# Patient Record
Sex: MALE | NOT HISPANIC OR LATINO | URBAN - METROPOLITAN AREA
[De-identification: names, ages, dates, MRNs, and addresses within clinical notes are randomized per-mention and may not be internally consistent; named-entity substitution may affect disease eponyms.]

---

## 2021-11-23 ENCOUNTER — INPATIENT (INPATIENT)
Facility: HOSPITAL | Age: 36
LOS: 0 days | Discharge: HOME | DRG: 177 | End: 2021-11-24
Attending: HOSPITALIST | Admitting: HOSPITALIST
Payer: OTHER GOVERNMENT

## 2021-11-23 VITALS
TEMPERATURE: 103 F | HEART RATE: 122 BPM | HEIGHT: 74 IN | SYSTOLIC BLOOD PRESSURE: 126 MMHG | DIASTOLIC BLOOD PRESSURE: 84 MMHG | RESPIRATION RATE: 18 BRPM | OXYGEN SATURATION: 97 % | WEIGHT: 266.76 LBS

## 2021-11-23 DIAGNOSIS — U07.1 COVID-19: ICD-10-CM

## 2021-11-23 LAB
ALBUMIN SERPL ELPH-MCNC: 2.8 G/DL — LOW (ref 3.5–5)
ALP SERPL-CCNC: 58 U/L — SIGNIFICANT CHANGE UP (ref 40–120)
ALT FLD-CCNC: 44 U/L DA — SIGNIFICANT CHANGE UP (ref 10–60)
ANION GAP SERPL CALC-SCNC: 9 MMOL/L — SIGNIFICANT CHANGE UP (ref 5–17)
APTT BLD: 34.8 SEC — SIGNIFICANT CHANGE UP (ref 27.5–35.5)
AST SERPL-CCNC: 21 U/L — SIGNIFICANT CHANGE UP (ref 10–40)
BASOPHILS # BLD AUTO: 0.02 K/UL — SIGNIFICANT CHANGE UP (ref 0–0.2)
BASOPHILS NFR BLD AUTO: 0.3 % — SIGNIFICANT CHANGE UP (ref 0–2)
BILIRUB SERPL-MCNC: 0.6 MG/DL — SIGNIFICANT CHANGE UP (ref 0.2–1.2)
BUN SERPL-MCNC: 15 MG/DL — SIGNIFICANT CHANGE UP (ref 7–18)
CALCIUM SERPL-MCNC: 9 MG/DL — SIGNIFICANT CHANGE UP (ref 8.4–10.5)
CHLORIDE SERPL-SCNC: 96 MMOL/L — SIGNIFICANT CHANGE UP (ref 96–108)
CO2 SERPL-SCNC: 27 MMOL/L — SIGNIFICANT CHANGE UP (ref 22–31)
CREAT SERPL-MCNC: 1.46 MG/DL — HIGH (ref 0.5–1.3)
EOSINOPHIL # BLD AUTO: 0 K/UL — SIGNIFICANT CHANGE UP (ref 0–0.5)
EOSINOPHIL NFR BLD AUTO: 0 % — SIGNIFICANT CHANGE UP (ref 0–6)
GLUCOSE SERPL-MCNC: 117 MG/DL — HIGH (ref 70–99)
HCT VFR BLD CALC: 49.3 % — SIGNIFICANT CHANGE UP (ref 39–50)
HGB BLD-MCNC: 16.8 G/DL — SIGNIFICANT CHANGE UP (ref 13–17)
IMM GRANULOCYTES NFR BLD AUTO: 0.1 % — SIGNIFICANT CHANGE UP (ref 0–1.5)
INR BLD: 1.21 RATIO — HIGH (ref 0.88–1.16)
LACTATE SERPL-SCNC: 1 MMOL/L — SIGNIFICANT CHANGE UP (ref 0.7–2)
LYMPHOCYTES # BLD AUTO: 1.18 K/UL — SIGNIFICANT CHANGE UP (ref 1–3.3)
LYMPHOCYTES # BLD AUTO: 17.5 % — SIGNIFICANT CHANGE UP (ref 13–44)
MCHC RBC-ENTMCNC: 28.5 PG — SIGNIFICANT CHANGE UP (ref 27–34)
MCHC RBC-ENTMCNC: 34.1 GM/DL — SIGNIFICANT CHANGE UP (ref 32–36)
MCV RBC AUTO: 83.6 FL — SIGNIFICANT CHANGE UP (ref 80–100)
MONOCYTES # BLD AUTO: 0.58 K/UL — SIGNIFICANT CHANGE UP (ref 0–0.9)
MONOCYTES NFR BLD AUTO: 8.6 % — SIGNIFICANT CHANGE UP (ref 2–14)
NEUTROPHILS # BLD AUTO: 4.97 K/UL — SIGNIFICANT CHANGE UP (ref 1.8–7.4)
NEUTROPHILS NFR BLD AUTO: 73.5 % — SIGNIFICANT CHANGE UP (ref 43–77)
NRBC # BLD: 0 /100 WBCS — SIGNIFICANT CHANGE UP (ref 0–0)
PLATELET # BLD AUTO: 173 K/UL — SIGNIFICANT CHANGE UP (ref 150–400)
POTASSIUM SERPL-MCNC: 4.6 MMOL/L — SIGNIFICANT CHANGE UP (ref 3.5–5.3)
POTASSIUM SERPL-SCNC: 4.6 MMOL/L — SIGNIFICANT CHANGE UP (ref 3.5–5.3)
PROT SERPL-MCNC: 8.2 G/DL — SIGNIFICANT CHANGE UP (ref 6–8.3)
PROTHROM AB SERPL-ACNC: 14.3 SEC — HIGH (ref 10.6–13.6)
RAPID RVP RESULT: DETECTED
RBC # BLD: 5.9 M/UL — HIGH (ref 4.2–5.8)
RBC # FLD: 12.5 % — SIGNIFICANT CHANGE UP (ref 10.3–14.5)
SARS-COV-2 RNA SPEC QL NAA+PROBE: DETECTED
SODIUM SERPL-SCNC: 132 MMOL/L — LOW (ref 135–145)
WBC # BLD: 6.76 K/UL — SIGNIFICANT CHANGE UP (ref 3.8–10.5)
WBC # FLD AUTO: 6.76 K/UL — SIGNIFICANT CHANGE UP (ref 3.8–10.5)

## 2021-11-23 PROCEDURE — 71045 X-RAY EXAM CHEST 1 VIEW: CPT | Mod: 26

## 2021-11-23 PROCEDURE — 99223 1ST HOSP IP/OBS HIGH 75: CPT

## 2021-11-23 PROCEDURE — 99285 EMERGENCY DEPT VISIT HI MDM: CPT

## 2021-11-23 RX ORDER — ACETAMINOPHEN 500 MG
650 TABLET ORAL ONCE
Refills: 0 | Status: COMPLETED | OUTPATIENT
Start: 2021-11-23 | End: 2021-11-23

## 2021-11-23 RX ORDER — SODIUM CHLORIDE 9 MG/ML
1000 INJECTION INTRAMUSCULAR; INTRAVENOUS; SUBCUTANEOUS ONCE
Refills: 0 | Status: COMPLETED | OUTPATIENT
Start: 2021-11-23 | End: 2021-11-23

## 2021-11-23 RX ORDER — SODIUM CHLORIDE 9 MG/ML
2500 INJECTION INTRAMUSCULAR; INTRAVENOUS; SUBCUTANEOUS ONCE
Refills: 0 | Status: COMPLETED | OUTPATIENT
Start: 2021-11-23 | End: 2021-11-23

## 2021-11-23 RX ADMIN — Medication 650 MILLIGRAM(S): at 19:44

## 2021-11-23 RX ADMIN — SODIUM CHLORIDE 2500 MILLILITER(S): 9 INJECTION INTRAMUSCULAR; INTRAVENOUS; SUBCUTANEOUS at 19:44

## 2021-11-23 RX ADMIN — SODIUM CHLORIDE 1000 MILLILITER(S): 9 INJECTION INTRAMUSCULAR; INTRAVENOUS; SUBCUTANEOUS at 22:12

## 2021-11-23 RX ADMIN — Medication 650 MILLIGRAM(S): at 22:12

## 2021-11-23 NOTE — ED PROVIDER NOTE - OBJECTIVE STATEMENT
Patient reports 3 days of fever, cough, vomiting, diarrhea, abd pain. Vomit is NBNB. Diarrhea is watery, non-bloody. Abdominal pain is diffuse, crampy, intermittent. No ha, cp, sob

## 2021-11-23 NOTE — H&P ADULT - ATTENDING COMMENTS
Pt seen and examined.  Case discussed with MAR.  35 year old man with 3 days of fever and cough. Denies SOB or dizziness. Associated GI symptoms - nausea, vomiting, abdominal pain and diarrhea.    Vital Signs Last 24 Hrs  T(C): 37.2 (23 Nov 2021 21:25), Max: 39.4 (23 Nov 2021 18:59)  T(F): 98.9 (23 Nov 2021 21:25), Max: 102.9 (23 Nov 2021 18:59)  HR: 92 (23 Nov 2021 21:25) (92 - 122)  BP: 89/57 (23 Nov 2021 21:25) (89/57 - 126/84)  RR: 18 (23 Nov 2021 21:25) (18 - 18)  SpO2: 96% (23 Nov 2021 21:25) (96% - 97%)      Labs                         16.8   6.76  )-----------( 173      ( 23 Nov 2021 19:35 )             49.3     11-23    132<L>  |  96  |  15  ----------------------------<  117<H>  4.6   |  27  |  1.46<H>    Ca    9.0      23 Nov 2021 19:35    TPro  8.2  /  Alb  2.8<L>  /  TBili  0.6  /  DBili  x   /  AST  21  /  ALT  44  /  AlkPhos  58  11-23    SARS-CoV-2: Detected (23 Nov 2021 19:35)    CXR   B/L widespread interstitial infiltrates - R>> L    Impression   - Acute COVID 19 infection   - CARLA -pre-renal from   - Hypotension     A/P   Admit to Medicine with airborne precaution   Supportive care  Check O2 saturation   Evaluate for monoclonial antibodies infusion   Gentle hydration   Monitor vital signs and chemistry Pt seen and examined.  Case discussed with MAR.  35 year old man with 3 days of fever and cough. Denies SOB or dizziness. Associated GI symptoms - nausea, vomiting, abdominal pain and diarrhea.    Vital Signs Last 24 Hrs  T(C): 37.2 (23 Nov 2021 21:25), Max: 39.4 (23 Nov 2021 18:59)  T(F): 98.9 (23 Nov 2021 21:25), Max: 102.9 (23 Nov 2021 18:59)  HR: 92 (23 Nov 2021 21:25) (92 - 122)  BP: 89/57 (23 Nov 2021 21:25) (89/57 - 126/84)  RR: 18 (23 Nov 2021 21:25) (18 - 18)  SpO2: 96% (23 Nov 2021 21:25) (96% - 97%)      Labs                         16.8   6.76  )-----------( 173      ( 23 Nov 2021 19:35 )             49.3     11-23    132<L>  |  96  |  15  ----------------------------<  117<H>  4.6   |  27  |  1.46<H>    Ca    9.0      23 Nov 2021 19:35    TPro  8.2  /  Alb  2.8<L>  /  TBili  0.6  /  DBili  x   /  AST  21  /  ALT  44  /  AlkPhos  58  11-23    SARS-CoV-2: Detected (23 Nov 2021 19:35)    CXR   B/L widespread interstitial infiltrates - R>> L    Impression   - Acute mild COVID 19 infection with mostly GI symptoms  - CARLA -pre-renal from GI losses  - Hypotension responsive to IVF    A/P   Admit to Medicine with airborne precaution   Supportive care  Check O2 saturation   Evaluate for monoclonial antibodies infusion   Gentle hydration   Monitor vital signs and chemistry  Discharge planning - estimated length of stay  < 24 hours

## 2021-11-23 NOTE — H&P ADULT - HISTORY OF PRESENT ILLNESS
35 year old man with no significant past medical hx came to ED c/o cough, fever, chills, headache, nausea, vomiting, diarrhea, poor oral intake for the last 3 days. Patient is unvaccinated for COVID19, tested positive for COVID19 on admission. Patient denies SOB, palpitations, chest pain, bleeding, hemoptysis, abdominal pain or dizziness.

## 2021-11-23 NOTE — ED PROVIDER NOTE - PROGRESS NOTE DETAILS
Patient is resting comfortably, NAD. Now hypotensive, though he feels much better. WIll admit for IV fluids. Endorsed to Dr. veras

## 2021-11-23 NOTE — H&P ADULT - PROBLEM SELECTOR PLAN 2
- On admission, patient with Cr 1.4  - CARLA most likely pre renal in the setting of acute infection, poor oral intake, GI losses  - F/u UA, urine lytes (urine sodium, urine creatinine, urine urea)  - Avoid nephrotoxins, NSAIDS, ACEI and ARBS  - Started on IVF   - Monitor BMP daily - On admission, patient with Cr 1.4  - CARLA most likely pre renal in the setting of acute infection, poor oral intake, GI losses  - F/u UA, urine lytes (urine sodium, urine creatinine, urine urea)  - Avoid nephrotoxins, NSAIDS, ACE I and ARBS  - Started on IVF   - Monitor BMP daily

## 2021-11-23 NOTE — H&P ADULT - PROBLEM SELECTOR PLAN 4
IMPROVE VTE Individual Risk Assessment  RISK                                                                Points  [  ] Previous VTE                                                  3  [  ] Thrombophilia                                               2  [  ] Lower limb paralysis                                      2        (unable to hold up >15 seconds)    [  ] Current Cancer                                              2         (within 6 months)  [  ] Immobilization > 24 hrs                                1  [  ] ICU/CCU stay > 24 hours                              1  [  ] Age > 60                                                      1  IMPROVE VTE Score _________    PPI for GI prophylaxis  Lovenox for DVT PPX

## 2021-11-23 NOTE — H&P ADULT - NSHPREVIEWOFSYSTEMS_GEN_ALL_CORE
Constitutional- fever+, chills+, no weight loss, weight gain or generalized weakness  Eyes – no vision loss or changes, no pain, no redness  ENT  – No hearing changes, no tinnitus, no discharge, no pain  - No runny nose, no congestion, no pain  - No sore throat, no hoarseness, no mouth sores, no voice change  CVS – No chest pain/no palpitations, or SOB  Respiratory - no hemoptysis, wheezing, or SOB  GI – diarrhea+, no dysphagia, heartburn, nausea, anorexia, emesis, abd pain, or change in bowel habits   – no frequency, urgency, hesitancy, nocturia, discharge, or weak stream  Skin – no rashes, lumps, or pruritus  AGUSTIN – no joint pain, stiffness, back pain, redness or swelling in joints  Psych – no confusion, depression , anxiety, or insomnia  Neuro – no headache dizziness, syncope, seizures, tremors, numbness, amnesia, or falls  Endocrine – no cold, heat intolerance, sweating, excessive hunger or thirst

## 2021-11-23 NOTE — ED ADULT NURSE NOTE - ISOLATION TYPE:
Pt c/o ongoing SOB from \"being in the streets and cold all the time.\" Pt was seen in ED 12/28 for same CC, pending covid results, using albuterol inhaler but states no relief.   
None

## 2021-11-23 NOTE — H&P ADULT - MUSCULOSKELETAL
normal mood with appropriate affect ROM intact/no joint swelling/no joint erythema/no joint warmth/normal strength detailed exam

## 2021-11-23 NOTE — H&P ADULT - PROBLEM SELECTOR PLAN 1
- On admission, patient was c/o cough, fever  - CXR showed bilateral patchy infiltrates  - COVID19 PCR +ve  - F/u COVID19 AB, LDH, ferritin, D-Dimer, CRP every 3 days   - Tylenol, Albuterol Inhaler, Robitussin PRN  - Monitor respiratory status   - Started on Decadron 6 mg IV daily for 10 days   - Start oxygen supplementation if O2 sat <92%  - Isolation precautions - On admission, patient was c/o cough, fever  - CXR showed bilateral patchy infiltrates  - COVID19 PCR +ve  - F/u COVID19 AB, LDH, ferritin, D-Dimer, CRP every 3 days   - Tylenol, Albuterol Inhaler, Robitussin PRN  - Monitor respiratory status   - Start oxygen supplementation if O2 sat <92%  - Isolation precautions Pt with mild infection with mostly GI symptom  Concerned more about his vomiting and diarrhea  Admitted for hypotension  - CXR showed bilateral patchy infiltrates  - MILD COVID 19 Infection   - COVID19 PCR +ve  - F/u COVID19 AB, LDH, ferritin, D-Dimer, CRP   - Tylenol, Albuterol Inhaler, Robitussin PRN  - Monitor respiratory status   - Start oxygen supplementation if O2 sat <92%  - Isolation precautions  - Estimate discharge today

## 2021-11-23 NOTE — H&P ADULT - PROBLEM SELECTOR PLAN 3
- Patient with hypotension most likely 2/2 viral infection   - Started on IVF   - Monitor BP - Patient with hypotension most likely 2/2 Gi symptoms of COVID 19 infection  - Started on IVF   - Monitor BP

## 2021-11-24 VITALS
DIASTOLIC BLOOD PRESSURE: 67 MMHG | SYSTOLIC BLOOD PRESSURE: 111 MMHG | OXYGEN SATURATION: 97 % | HEART RATE: 78 BPM | TEMPERATURE: 98 F | RESPIRATION RATE: 18 BRPM

## 2021-11-24 DIAGNOSIS — I95.9 HYPOTENSION, UNSPECIFIED: ICD-10-CM

## 2021-11-24 DIAGNOSIS — U07.1 COVID-19: ICD-10-CM

## 2021-11-24 DIAGNOSIS — Z29.9 ENCOUNTER FOR PROPHYLACTIC MEASURES, UNSPECIFIED: ICD-10-CM

## 2021-11-24 DIAGNOSIS — N17.9 ACUTE KIDNEY FAILURE, UNSPECIFIED: ICD-10-CM

## 2021-11-24 LAB
ANION GAP SERPL CALC-SCNC: 7 MMOL/L — SIGNIFICANT CHANGE UP (ref 5–17)
APPEARANCE UR: CLEAR — SIGNIFICANT CHANGE UP
BILIRUB UR-MCNC: NEGATIVE — SIGNIFICANT CHANGE UP
BUN SERPL-MCNC: 12 MG/DL — SIGNIFICANT CHANGE UP (ref 7–18)
CALCIUM SERPL-MCNC: 8.3 MG/DL — LOW (ref 8.4–10.5)
CHLORIDE SERPL-SCNC: 104 MMOL/L — SIGNIFICANT CHANGE UP (ref 96–108)
CO2 SERPL-SCNC: 24 MMOL/L — SIGNIFICANT CHANGE UP (ref 22–31)
COLOR SPEC: YELLOW — SIGNIFICANT CHANGE UP
CREAT ?TM UR-MCNC: 66 MG/DL — SIGNIFICANT CHANGE UP
CREAT SERPL-MCNC: 1.07 MG/DL — SIGNIFICANT CHANGE UP (ref 0.5–1.3)
CRP SERPL-MCNC: 68 MG/L — HIGH
D DIMER BLD IA.RAPID-MCNC: 402 NG/ML DDU — HIGH
DIFF PNL FLD: ABNORMAL
FERRITIN SERPL-MCNC: 613 NG/ML — HIGH (ref 30–400)
GLUCOSE SERPL-MCNC: 131 MG/DL — HIGH (ref 70–99)
GLUCOSE UR QL: NEGATIVE — SIGNIFICANT CHANGE UP
HCT VFR BLD CALC: 42.2 % — SIGNIFICANT CHANGE UP (ref 39–50)
HGB BLD-MCNC: 14.3 G/DL — SIGNIFICANT CHANGE UP (ref 13–17)
KETONES UR-MCNC: ABNORMAL
LDH SERPL L TO P-CCNC: 301 U/L — HIGH (ref 120–225)
LEUKOCYTE ESTERASE UR-ACNC: NEGATIVE — SIGNIFICANT CHANGE UP
MAGNESIUM SERPL-MCNC: 2.2 MG/DL — SIGNIFICANT CHANGE UP (ref 1.6–2.6)
MCHC RBC-ENTMCNC: 28.5 PG — SIGNIFICANT CHANGE UP (ref 27–34)
MCHC RBC-ENTMCNC: 33.9 GM/DL — SIGNIFICANT CHANGE UP (ref 32–36)
MCV RBC AUTO: 84.1 FL — SIGNIFICANT CHANGE UP (ref 80–100)
NITRITE UR-MCNC: NEGATIVE — SIGNIFICANT CHANGE UP
NRBC # BLD: 0 /100 WBCS — SIGNIFICANT CHANGE UP (ref 0–0)
PH UR: 6 — SIGNIFICANT CHANGE UP (ref 5–8)
PHOSPHATE SERPL-MCNC: 2.3 MG/DL — LOW (ref 2.5–4.5)
PLATELET # BLD AUTO: 156 K/UL — SIGNIFICANT CHANGE UP (ref 150–400)
POTASSIUM SERPL-MCNC: 3.9 MMOL/L — SIGNIFICANT CHANGE UP (ref 3.5–5.3)
POTASSIUM SERPL-SCNC: 3.9 MMOL/L — SIGNIFICANT CHANGE UP (ref 3.5–5.3)
PROT UR-MCNC: 100
RBC # BLD: 5.02 M/UL — SIGNIFICANT CHANGE UP (ref 4.2–5.8)
RBC # FLD: 12.7 % — SIGNIFICANT CHANGE UP (ref 10.3–14.5)
SODIUM SERPL-SCNC: 135 MMOL/L — SIGNIFICANT CHANGE UP (ref 135–145)
SODIUM UR-SCNC: 70 MMOL/L — SIGNIFICANT CHANGE UP
SP GR SPEC: 1.01 — SIGNIFICANT CHANGE UP (ref 1.01–1.02)
UROBILINOGEN FLD QL: NEGATIVE — SIGNIFICANT CHANGE UP
UUN UR-MCNC: 425 MG/DL — SIGNIFICANT CHANGE UP
WBC # BLD: 6.16 K/UL — SIGNIFICANT CHANGE UP (ref 3.8–10.5)
WBC # FLD AUTO: 6.16 K/UL — SIGNIFICANT CHANGE UP (ref 3.8–10.5)

## 2021-11-24 PROCEDURE — 87040 BLOOD CULTURE FOR BACTERIA: CPT

## 2021-11-24 PROCEDURE — 85379 FIBRIN DEGRADATION QUANT: CPT

## 2021-11-24 PROCEDURE — 83605 ASSAY OF LACTIC ACID: CPT

## 2021-11-24 PROCEDURE — 80053 COMPREHEN METABOLIC PANEL: CPT

## 2021-11-24 PROCEDURE — 83615 LACTATE (LD) (LDH) ENZYME: CPT

## 2021-11-24 PROCEDURE — 93005 ELECTROCARDIOGRAM TRACING: CPT

## 2021-11-24 PROCEDURE — 86140 C-REACTIVE PROTEIN: CPT

## 2021-11-24 PROCEDURE — 36415 COLL VENOUS BLD VENIPUNCTURE: CPT

## 2021-11-24 PROCEDURE — 82728 ASSAY OF FERRITIN: CPT

## 2021-11-24 PROCEDURE — 85730 THROMBOPLASTIN TIME PARTIAL: CPT

## 2021-11-24 PROCEDURE — 85027 COMPLETE CBC AUTOMATED: CPT

## 2021-11-24 PROCEDURE — 84540 ASSAY OF URINE/UREA-N: CPT

## 2021-11-24 PROCEDURE — 99285 EMERGENCY DEPT VISIT HI MDM: CPT | Mod: 25

## 2021-11-24 PROCEDURE — 83735 ASSAY OF MAGNESIUM: CPT

## 2021-11-24 PROCEDURE — 87641 MR-STAPH DNA AMP PROBE: CPT

## 2021-11-24 PROCEDURE — 81001 URINALYSIS AUTO W/SCOPE: CPT

## 2021-11-24 PROCEDURE — 82570 ASSAY OF URINE CREATININE: CPT

## 2021-11-24 PROCEDURE — 87640 STAPH A DNA AMP PROBE: CPT

## 2021-11-24 PROCEDURE — 71045 X-RAY EXAM CHEST 1 VIEW: CPT

## 2021-11-24 PROCEDURE — 87086 URINE CULTURE/COLONY COUNT: CPT

## 2021-11-24 PROCEDURE — 0225U NFCT DS DNA&RNA 21 SARSCOV2: CPT

## 2021-11-24 PROCEDURE — 80048 BASIC METABOLIC PNL TOTAL CA: CPT

## 2021-11-24 PROCEDURE — 84100 ASSAY OF PHOSPHORUS: CPT

## 2021-11-24 PROCEDURE — 99239 HOSP IP/OBS DSCHRG MGMT >30: CPT

## 2021-11-24 PROCEDURE — 84300 ASSAY OF URINE SODIUM: CPT

## 2021-11-24 PROCEDURE — 85025 COMPLETE CBC W/AUTO DIFF WBC: CPT

## 2021-11-24 PROCEDURE — 85610 PROTHROMBIN TIME: CPT

## 2021-11-24 RX ORDER — DEXAMETHASONE 0.5 MG/5ML
6 ELIXIR ORAL DAILY
Refills: 0 | Status: DISCONTINUED | OUTPATIENT
Start: 2021-11-24 | End: 2021-11-24

## 2021-11-24 RX ORDER — SODIUM,POTASSIUM PHOSPHATES 278-250MG
1 POWDER IN PACKET (EA) ORAL ONCE
Refills: 0 | Status: COMPLETED | OUTPATIENT
Start: 2021-11-24 | End: 2021-11-24

## 2021-11-24 RX ORDER — ENOXAPARIN SODIUM 100 MG/ML
40 INJECTION SUBCUTANEOUS DAILY
Refills: 0 | Status: DISCONTINUED | OUTPATIENT
Start: 2021-11-24 | End: 2021-11-24

## 2021-11-24 RX ORDER — SODIUM,POTASSIUM PHOSPHATES 278-250MG
1 POWDER IN PACKET (EA) ORAL
Refills: 0 | Status: DISCONTINUED | OUTPATIENT
Start: 2021-11-24 | End: 2021-11-24

## 2021-11-24 RX ORDER — PANTOPRAZOLE SODIUM 20 MG/1
1 TABLET, DELAYED RELEASE ORAL
Qty: 30 | Refills: 0
Start: 2021-11-24 | End: 2021-12-23

## 2021-11-24 RX ORDER — ONDANSETRON 8 MG/1
4 TABLET, FILM COATED ORAL EVERY 8 HOURS
Refills: 0 | Status: DISCONTINUED | OUTPATIENT
Start: 2021-11-24 | End: 2021-11-24

## 2021-11-24 RX ORDER — INFLUENZA VIRUS VACCINE 15; 15; 15; 15 UG/.5ML; UG/.5ML; UG/.5ML; UG/.5ML
0.5 SUSPENSION INTRAMUSCULAR ONCE
Refills: 0 | Status: COMPLETED | OUTPATIENT
Start: 2021-11-24 | End: 2021-11-24

## 2021-11-24 RX ORDER — ASPIRIN/CALCIUM CARB/MAGNESIUM 324 MG
1 TABLET ORAL
Qty: 30 | Refills: 0
Start: 2021-11-24 | End: 2021-12-23

## 2021-11-24 RX ORDER — SODIUM CHLORIDE 9 MG/ML
1000 INJECTION INTRAMUSCULAR; INTRAVENOUS; SUBCUTANEOUS
Refills: 0 | Status: DISCONTINUED | OUTPATIENT
Start: 2021-11-24 | End: 2021-11-24

## 2021-11-24 RX ORDER — ALBUTEROL 90 UG/1
2 AEROSOL, METERED ORAL
Qty: 1 | Refills: 0
Start: 2021-11-24 | End: 2021-12-23

## 2021-11-24 RX ORDER — ALBUTEROL 90 UG/1
2 AEROSOL, METERED ORAL EVERY 6 HOURS
Refills: 0 | Status: DISCONTINUED | OUTPATIENT
Start: 2021-11-24 | End: 2021-11-24

## 2021-11-24 RX ORDER — ACETAMINOPHEN 500 MG
2 TABLET ORAL
Qty: 0 | Refills: 0 | DISCHARGE
Start: 2021-11-24

## 2021-11-24 RX ORDER — ACETAMINOPHEN 500 MG
650 TABLET ORAL EVERY 6 HOURS
Refills: 0 | Status: DISCONTINUED | OUTPATIENT
Start: 2021-11-24 | End: 2021-11-24

## 2021-11-24 RX ORDER — PANTOPRAZOLE SODIUM 20 MG/1
40 TABLET, DELAYED RELEASE ORAL
Refills: 0 | Status: DISCONTINUED | OUTPATIENT
Start: 2021-11-24 | End: 2021-11-24

## 2021-11-24 RX ORDER — PANTOPRAZOLE SODIUM 20 MG/1
1 TABLET, DELAYED RELEASE ORAL
Qty: 14 | Refills: 0
Start: 2021-11-24 | End: 2021-12-07

## 2021-11-24 RX ADMIN — SODIUM CHLORIDE 90 MILLILITER(S): 9 INJECTION INTRAMUSCULAR; INTRAVENOUS; SUBCUTANEOUS at 03:03

## 2021-11-24 RX ADMIN — Medication 6 MILLIGRAM(S): at 06:01

## 2021-11-24 RX ADMIN — Medication 650 MILLIGRAM(S): at 07:00

## 2021-11-24 RX ADMIN — Medication 1 PACKET(S): at 16:26

## 2021-11-24 RX ADMIN — Medication 650 MILLIGRAM(S): at 06:01

## 2021-11-24 RX ADMIN — PANTOPRAZOLE SODIUM 40 MILLIGRAM(S): 20 TABLET, DELAYED RELEASE ORAL at 06:02

## 2021-11-24 NOTE — PROGRESS NOTE ADULT - SUBJECTIVE AND OBJECTIVE BOX
Patient is a 35y old  Male who presents with a chief complaint of COVID19 INFECTION AND HYPOTENSION (2021 21:55)      INTERVAL HPI/OVERNIGHT EVENTS:  Patient seen and examined at bedside; continues with diarrhea ( x1) this am, + fevers otherwise no new complaints    MEDICATIONS  (STANDING):  enoxaparin Injectable 40 milliGRAM(s) SubCutaneous daily  influenza   Vaccine 0.5 milliLiter(s) IntraMuscular once  pantoprazole    Tablet 40 milliGRAM(s) Oral before breakfast  sodium chloride 0.9%. 1000 milliLiter(s) (90 mL/Hr) IV Continuous <Continuous>    MEDICATIONS  (PRN):  acetaminophen     Tablet .. 650 milliGRAM(s) Oral every 6 hours PRN Temp greater or equal to 38C (100.4F), Mild Pain (1 - 3)  ALBUTerol    90 MICROgram(s) HFA Inhaler 2 Puff(s) Inhalation every 6 hours PRN Shortness of Breath and/or Wheezing  ondansetron Injectable 4 milliGRAM(s) IV Push every 8 hours PRN Nausea and/or Vomiting      __________________________________________________  REVIEW OF SYSTEMS:    CONSTITUTIONAL: No fever,   EYES: no acute visual disturbances  NECK: No pain or stiffness  RESPIRATORY: No cough; No shortness of breath  CARDIOVASCULAR: No chest pain, no palpitations  GASTROINTESTINAL: No pain. No nausea or vomiting; see interval HPI  NEUROLOGICAL: No headache or numbness, no tremors  MUSCULOSKELETAL: No joint pain, no muscle pain  GENITOURINARY: no dysuria, no frequency, no hematuria  ALL OTHER  ROS negative        Vital Signs Last 24 Hrs  T(C): 37.4 (2021 08:28), Max: 39.4 (2021 18:59)  T(F): 99.3 (2021 08:28), Max: 102.9 (2021 18:59)  HR: 105 (2021 05:20) (89 - 122)  BP: 109/67 (2021 05:20) (89/57 - 126/84)  BP(mean): --  RR: 20 (2021 05:20) (18 - 20)  SpO2: 94% (2021 05:20) (94% - 99%)    ________________________________________________  PHYSICAL EXAM:  GENERAL: NAD  HEENT: Normocephalic;  atraumatic, neck supple   CHEST/LUNG: Breathing nonlabored; Clear to auscultation bilaterally  HEART: +S1 +S2  regular  ABDOMEN: Softly distended, Nontender; Bowel sounds present  EXTREMITIES: no cyanosis; no edema; no calf tenderness  SKIN: warm and dry; no rash  NERVOUS SYSTEM:  Awake and alert; Oriented  to place, person and time ; no new deficits    _________________________________________________  LABS:                        16.8   6.76  )-----------( 173      ( 2021 19:35 )             49.3     11-23    132<L>  |  96  |  15  ----------------------------<  117<H>  4.6   |  27  |  1.46<H>    Ca    9.0      2021 19:35    TPro  8.2  /  Alb  2.8<L>  /  TBili  0.6  /  DBili  x   /  AST  21  /  ALT  44  /  AlkPhos  58  11-23    PT/INR - ( 2021 19:35 )   PT: 14.3 sec;   INR: 1.21 ratio         PTT - ( 2021 19:35 )  PTT:34.8 sec  Urinalysis Basic - ( 2021 03:22 )    Color: Yellow / Appearance: Clear / S.015 / pH: x  Gluc: x / Ketone: Moderate  / Bili: Negative / Urobili: Negative   Blood: x / Protein: 100 / Nitrite: Negative   Leuk Esterase: Negative / RBC: 2-5 /HPF / WBC 0-2 /HPF   Sq Epi: x / Non Sq Epi: Few /HPF / Bacteria: Moderate /HPF      CAPILLARY BLOOD GLUCOSE            RADIOLOGY & ADDITIONAL TESTS:    Respiratory Viral Panel with COVID-19 by ETHAN (21 @ 19:35)    Rapid RVP Result: Detected    SARS-CoV-2: Detected: This Respiratory Panel uses polymerase chain reaction (PCR) to detect for  adenovirus; coronavirus (HKU1, NL63, 229E, OC43); human metapneumovirus  (hMPV); human enterovirus/rhinovirus (Entero/RV); influenza A; influenza  A/H1; influenza A/H3; influenza A/H1-2009; influenza B; parainfluenza  viruses 1, 2, 3, 4; respiratory syncytial virus; Mycoplasma pneumoniae;  Chlamydophila pneumoniae; and SARS-CoV-2.         Patient is a 35y old  Male who presents with a chief complaint of COVID19 INFECTION AND HYPOTENSION (2021 21:55)      INTERVAL HPI/OVERNIGHT EVENTS:  Patient seen and examined at bedside; continues with diarrhea ( x1) this am, + fevers otherwise no new complaints    MEDICATIONS  (STANDING):  enoxaparin Injectable 40 milliGRAM(s) SubCutaneous daily  influenza   Vaccine 0.5 milliLiter(s) IntraMuscular once  pantoprazole    Tablet 40 milliGRAM(s) Oral before breakfast  sodium chloride 0.9%. 1000 milliLiter(s) (90 mL/Hr) IV Continuous <Continuous>    MEDICATIONS  (PRN):  acetaminophen     Tablet .. 650 milliGRAM(s) Oral every 6 hours PRN Temp greater or equal to 38C (100.4F), Mild Pain (1 - 3)  ALBUTerol    90 MICROgram(s) HFA Inhaler 2 Puff(s) Inhalation every 6 hours PRN Shortness of Breath and/or Wheezing  ondansetron Injectable 4 milliGRAM(s) IV Push every 8 hours PRN Nausea and/or Vomiting      __________________________________________________  REVIEW OF SYSTEMS:    CONSTITUTIONAL: No fever,   EYES: no acute visual disturbances  NECK: No pain or stiffness  RESPIRATORY: No cough; No shortness of breath  CARDIOVASCULAR: No chest pain, no palpitations  GASTROINTESTINAL: No pain. No nausea or vomiting; see interval HPI  NEUROLOGICAL: No headache or numbness, no tremors  MUSCULOSKELETAL: No joint pain, no muscle pain  GENITOURINARY: no dysuria, no frequency, no hematuria  ALL OTHER  ROS negative        Vital Signs Last 24 Hrs  T(C): 37.4 (2021 08:28), Max: 39.4 (2021 18:59)  T(F): 99.3 (2021 08:28), Max: 102.9 (2021 18:59)  HR: 105 (2021 05:20) (89 - 122)  BP: 109/67 (2021 05:20) (89/57 - 126/84)  BP(mean): --  RR: 20 (2021 05:20) (18 - 20)  SpO2: 94% (2021 05:20) (94% - 99%)    ________________________________________________  PHYSICAL EXAM:  GENERAL: NAD  HEENT: Normocephalic;  atraumatic, neck supple   CHEST/LUNG: Breathing nonlabored; Clear to auscultation bilaterally  HEART: +S1 +S2  regular  ABDOMEN: Softly distended, Nontender; Bowel sounds present  EXTREMITIES: no cyanosis; no edema; no calf tenderness  SKIN: warm and dry; no rash  NERVOUS SYSTEM:  Awake and alert; Oriented  to place, person and time ; no new deficits    _________________________________________________  LABS:                        16.8   6.76  )-----------( 173      ( 2021 19:35 )             49.3     11-23    132<L>  |  96  |  15  ----------------------------<  117<H>  4.6   |  27  |  1.46<H>    Ca    9.0      2021 19:35    TPro  8.2  /  Alb  2.8<L>  /  TBili  0.6  /  DBili  x   /  AST  21  /  ALT  44  /  AlkPhos  58  11-23    PT/INR - ( 2021 19:35 )   PT: 14.3 sec;   INR: 1.21 ratio         PTT - ( 2021 19:35 )  PTT:34.8 sec  Urinalysis Basic - ( 2021 03:22 )    Color: Yellow / Appearance: Clear / S.015 / pH: x  Gluc: x / Ketone: Moderate  / Bili: Negative / Urobili: Negative   Blood: x / Protein: 100 / Nitrite: Negative   Leuk Esterase: Negative / RBC: 2-5 /HPF / WBC 0-2 /HPF   Sq Epi: x / Non Sq Epi: Few /HPF / Bacteria: Moderate /HPF      CAPILLARY BLOOD GLUCOSE            RADIOLOGY & ADDITIONAL TESTS:    Respiratory Viral Panel with COVID-19 by ETHAN (21 @ 19:35)    Rapid RVP Result: Detected    SARS-CoV-2: Detected: This Respiratory Panel uses polymerase chain reaction (PCR) to detect for  adenovirus; coronavirus (HKU1, NL63, 229E, OC43); human metapneumovirus  (hMPV); human enterovirus/rhinovirus (Entero/RV); influenza A; influenza  A/H1; influenza A/H3; influenza A/H1-2009; influenza B; parainfluenza  viruses 1, 2, 3, 4; respiratory syncytial virus; Mycoplasma pneumoniae;  Chlamydophila pneumoniae; and SARS-CoV-2.      35 year old man with no significant past medical hx came to ED c/o cough, fever, chills, headache, nausea, vomiting, diarrhea, poor oral intake for the last 3 days. Patient is unvaccinated for COVID19, tested positive for COVID19 on admission. Patient denies SOB, palpitations, chest pain, bleeding, hemoptysis, abdominal pain or dizziness.  His diarrhea has improved ( x1 today). Cxray revealed multifocal PNA; he remains asymptomatic in terms of his pulmonary status with oxygen saturation of 94%-97% on RA, Discussed with Dr Curiel; patient and patient deemed clear for discharge with prn bronchodilator therapy and pulse oximetry. Patient informed and is in agreement with plan verbalizing that he feels well currently.  See discharge note for further details

## 2021-11-24 NOTE — DISCHARGE NOTE NURSING/CASE MANAGEMENT/SOCIAL WORK - PATIENT PORTAL LINK FT
You can access the FollowMyHealth Patient Portal offered by [facility] by registering at the following website: [email or web address]. By joining [facility]’s FollowMyHealth portal, you will also be able to view your health information using other applications (apps) compatible with our system.

## 2021-11-24 NOTE — DISCHARGE NOTE PROVIDER - NSDCCPCAREPLAN_GEN_ALL_CORE_FT
PRINCIPAL DISCHARGE DIAGNOSIS  Diagnosis: Acute COVID-19  Assessment and Plan of Treatment: CORONAVIRUS INSTRUCTIONS: Based on your current clinical status and stability, it has been determined that you no longer need hospitalization and can recover while remaining in self-quarantine at home. You should follow the prevention steps below until a healthcare provider or local or state health department says you can return to your normal activities. 1. You should restrict activities outside your home, except for getting medical care. 2. Do not go to work, school, or public areas. 3. Avoid using public transportation, ride-sharing, or taxis. 4. Separate yourself from other people and animals in your home as much as possible.  When you are around other people (e.g., sharing a room or vehicle) you should wear a facemask.  5. Wash your hands often with soap and water for at least 20 seconds, especially after blowing your nose, coughing, or sneezing; going to the bathroom; and before eating or preparing food.6. Cover your mouth and nose with a tissue when you cough or sneeze. Throw used tissues in a lined trash can. Immediately wash your hands with soap and water for at least 20 seconds7. High touch surfaces include counters, tabletops, doorknobs, bathroom fixtures, toilets, phones, keyboards, tablets, and bedside tables.8. Avoid sharing dishes, drinking glasses, cups, eating utensils, towels, or bedding with other people or pets in your home. After using these items, they should be washed thoroughly with soap and water.You are strongly advised to seek prompt medical attention if your illness worsens or you develop new symptoms like fever or difficulty breathing.        SECONDARY DISCHARGE DIAGNOSES  Diagnosis: Hypotension  Assessment and Plan of Treatment: in the setting of diarrhea due to covid 19 infection  You were treated with Intravenous fluid therapy and your blood pressure is now within normal limits  Continue to follow up with your PMD for continuity of care    Diagnosis: Hypophosphatemia  Assessment and Plan of Treatment: Mildly  low phosphorous level; You were given supplementation  Follow up with your PMD within 1 week as recommended    Diagnosis: CARLA (acute kidney injury)  Assessment and Plan of Treatment: You were treated with Intravenous fluid therapy and your CARLA is now resolved  Follow up with your PMD for continued care    Diagnosis: Hyponatremia  Assessment and Plan of Treatment: You were treated with intravenous fluid therapy and your sodium level is now within normal limits     PRINCIPAL DISCHARGE DIAGNOSIS  Diagnosis: Acute COVID-19  Assessment and Plan of Treatment: CORONAVIRUS INSTRUCTIONS: Based on your current clinical status and stability, it has been determined that you no longer need hospitalization and can recover while remaining in self-quarantine at home. You should follow the prevention steps below until a healthcare provider or local or state health department says you can return to your normal activities. 1. You should restrict activities outside your home, except for getting medical care. 2. Do not go to work, school, or public areas. 3. Avoid using public transportation, ride-sharing, or taxis. 4. Separate yourself from other people and animals in your home as much as possible.  When you are around other people (e.g., sharing a room or vehicle) you should wear a facemask.  5. Wash your hands often with soap and water for at least 20 seconds, especially after blowing your nose, coughing, or sneezing; going to the bathroom; and before eating or preparing food.6. Cover your mouth and nose with a tissue when you cough or sneeze. Throw used tissues in a lined trash can. Immediately wash your hands with soap and water for at least 20 seconds7. High touch surfaces include counters, tabletops, doorknobs, bathroom fixtures, toilets, phones, keyboards, tablets, and bedside tables.8. Avoid sharing dishes, drinking glasses, cups, eating utensils, towels, or bedding with other people or pets in your home. After using these items, they should be washed thoroughly with soap and water.You are strongly advised to seek prompt medical attention if your illness worsens or you develop new symptoms like fever or difficulty breathing.        SECONDARY DISCHARGE DIAGNOSES  Diagnosis: Pneumonia due to COVID-19 virus  Assessment and Plan of Treatment: You are saturating well on Room air and do not require oxygen therapy currently  Continue to monitor your oxygen saturation with the pulse oximeter provided goal oxygenation above 90%  You are prescribed for as needed medications for cough and for shortness of breath if you should need.  Please present to the emergency room if you should develop shortness of breath that does not improve with bronchodilator therapy or if your oxygen saturation remains 90% or below   Follow up with your PMD within 7-10 days    Diagnosis: Hypotension  Assessment and Plan of Treatment: in the setting of diarrhea due to covid 19 infection  You were treated with Intravenous fluid therapy and your blood pressure is now within normal limits  Continue to follow up with your PMD for continuity of care    Diagnosis: Hypophosphatemia  Assessment and Plan of Treatment: Mildly  low phosphorous level; You were given supplementation  Follow up with your PMD within 1 week as recommended    Diagnosis: CARLA (acute kidney injury)  Assessment and Plan of Treatment: You were treated with Intravenous fluid therapy and your CARLA is now resolved  Follow up with your PMD for continued care    Diagnosis: Hyponatremia  Assessment and Plan of Treatment: You were treated with intravenous fluid therapy and your sodium level is now within normal limits     PRINCIPAL DISCHARGE DIAGNOSIS  Diagnosis: Acute COVID-19  Assessment and Plan of Treatment: CORONAVIRUS INSTRUCTIONS: Based on your current clinical status and stability, it has been determined that you no longer need hospitalization and can recover while remaining in self-quarantine at home. You should follow the prevention steps below until a healthcare provider or local or state health department says you can return to your normal activities. 1. You should restrict activities outside your home, except for getting medical care. 2. Do not go to work, school, or public areas. 3. Avoid using public transportation, ride-sharing, or taxis. 4. Separate yourself from other people and animals in your home as much as possible.  When you are around other people (e.g., sharing a room or vehicle) you should wear a facemask.  5. Wash your hands often with soap and water for at least 20 seconds, especially after blowing your nose, coughing, or sneezing; going to the bathroom; and before eating or preparing food.6. Cover your mouth and nose with a tissue when you cough or sneeze. Throw used tissues in a lined trash can. Immediately wash your hands with soap and water for at least 20 seconds7. High touch surfaces include counters, tabletops, doorknobs, bathroom fixtures, toilets, phones, keyboards, tablets, and bedside tables.8. Avoid sharing dishes, drinking glasses, cups, eating utensils, towels, or bedding with other people or pets in your home. After using these items, they should be washed thoroughly with soap and water.You are strongly advised to seek prompt medical attention if your illness worsens or you develop new symptoms like fever or difficulty breathing.        SECONDARY DISCHARGE DIAGNOSES  Diagnosis: Pneumonia due to COVID-19 virus  Assessment and Plan of Treatment: You are saturating well on Room air and do not require oxygen therapy currently  Continue to monitor your oxygen saturation with the pulse oximeter provided ; goal oxygenation above 90%  You are prescribed for as needed medications for cough and for shortness of breath if you should need.  Please present to the emergency room if you should develop shortness of breath that does not improve with bronchodilator therapy or if your oxygen saturation remains 90% or below   Follow up with your PMD within 1 week    Diagnosis: Hypotension  Assessment and Plan of Treatment: in the setting of diarrhea due to covid 19 infection  You were treated with Intravenous fluid therapy and your blood pressure is now within normal limits  Continue to follow up with your PMD for continuity of care    Diagnosis: Hypophosphatemia  Assessment and Plan of Treatment: Mildly  low phosphorous level; You were given supplementation  Follow up with your PMD within 1 week as recommended    Diagnosis: CARLA (acute kidney injury)  Assessment and Plan of Treatment: You were treated with Intravenous fluid therapy and your CARLA is now resolved  Follow up with your PMD for continued care    Diagnosis: Hyponatremia  Assessment and Plan of Treatment: You were treated with intravenous fluid therapy and your sodium level is now within normal limits  [facility] care

## 2021-11-24 NOTE — DISCHARGE NOTE PROVIDER - ATTENDING COMMENTS
patient seen and examined. Currently says that he feels "hot" but otherwise no longer with dizziness. Denies any diarrhea today.  Does have Tmax of 102. CXR with multifocal PNA. Currently does not have any oxygen requirements. Hypotension resolved and maintaining BP.   Plan for dc today, able to quarantine home. Strict return precautions given. Will give pulse ox and instructed to present to ED if oxygen < 88%

## 2021-11-24 NOTE — DISCHARGE NOTE PROVIDER - PROVIDER TOKENS
FREE:[LAST[facility]FIRST:[[facility]PHONE:[(   )    -],FAX:[(   )    -],ADDRESS:[PMD at [facility]],FOLLOWUP:[1 week]]

## 2021-11-24 NOTE — DISCHARGE NOTE PROVIDER - CARE PROVIDER_API CALL
[facility]  PMD at [facility]  Phone: (   )    -  Fax: (   )    -  Follow Up Time: 1 week

## 2021-11-24 NOTE — DISCHARGE NOTE PROVIDER - NSDCMRMEDTOKEN_GEN_ALL_CORE_FT
acetaminophen 325 mg oral tablet: 2 tab(s) orally every 6 hours, As needed, Temp greater or equal to 38C (100.4F), Mild Pain (1 - 3)   acetaminophen 325 mg oral tablet: 2 tab(s) orally every 6 hours, As needed, Temp greater or equal to 38C (100.4F), Mild Pain (1 - 3)  albuterol 90 mcg/inh inhalation aerosol: 2 puff(s) inhaled every 6 hours, As needed, Shortness of Breath and/or Wheezing  Aspirin Enteric Coated 81 mg oral delayed release tablet: 1 tab(s) orally once a day x 30 days covid 19  prophylactic therapy   guaiFENesin 100 mg/5 mL oral liquid: 10 milliliter(s) orally every 6 hours, As needed, Cough  pantoprazole 40 mg oral delayed release tablet: 1 tab(s) orally once a day (before a meal)

## 2021-11-24 NOTE — DISCHARGE NOTE PROVIDER - HOSPITAL COURSE
35 year old man with no significant past medical hx came to ED c/o cough, fever, chills, headache, nausea, vomiting, diarrhea, poor oral intake for the last 3 days. Patient is unvaccinated for COVID19, tested positive for COVID19 on admission. Patient denies SOB, palpitations, chest pain, bleeding, hemoptysis, abdominal pain or dizziness.    35 year old man with no significant past medical hx came to ED c/o cough, fever, chills, headache, nausea, vomiting, diarrhea, poor oral intake for the last 3 days. Patient is unvaccinated for COVID19, tested positive for COVID19 on admission. Patient denies SOB, palpitations, chest pain, bleeding, hemoptysis, abdominal pain or dizziness.  His diarrhea has improved ( x1 today). Cxray revealed multifocal PNA; he remains asymptomatic in terms of his pulmonary status with oxygen saturation of 94%, Discussed with Dr Curiel; patient and patient deemed clear for discharge with prn bronchodilator therapy and pulse oximetry. Patient informed and is in agreement with plan verbalizing that he feels well currently.    Please note that this is a brief summary of patient's hospitalization. Refer to EMR for further details. 35 year old man with no significant past medical hx came to ED c/o cough, fever, chills, headache, nausea, vomiting, diarrhea, poor oral intake for the last 3 days. Patient is unvaccinated for COVID19, tested positive for COVID19 on admission. Patient denies SOB, palpitations, chest pain, bleeding, hemoptysis, abdominal pain or dizziness.  His diarrhea has improved ( x1 today). Cxray revealed multifocal PNA; he remains asymptomatic in terms of his pulmonary status with oxygen saturation of 94%-97% on RA, Discussed with Dr Curiel; patient and patient deemed clear for discharge with prn bronchodilator therapy and pulse oximetry. Patient informed and is in agreement with plan verbalizing that he feels well currently.    Please note that this is a brief summary of patient's hospitalization. Refer to EMR for further details.

## 2021-11-25 ENCOUNTER — INPATIENT (INPATIENT)
Facility: HOSPITAL | Age: 36
LOS: 7 days | Discharge: HOME | DRG: 871 | End: 2021-12-03
Attending: INTERNAL MEDICINE | Admitting: INTERNAL MEDICINE
Payer: OTHER GOVERNMENT

## 2021-11-25 VITALS
HEIGHT: 74 IN | HEART RATE: 124 BPM | TEMPERATURE: 99 F | OXYGEN SATURATION: 92 % | DIASTOLIC BLOOD PRESSURE: 67 MMHG | RESPIRATION RATE: 22 BRPM | SYSTOLIC BLOOD PRESSURE: 102 MMHG | WEIGHT: 270.07 LBS

## 2021-11-25 DIAGNOSIS — R09.02 HYPOXEMIA: ICD-10-CM

## 2021-11-25 LAB
ALBUMIN SERPL ELPH-MCNC: 2.6 G/DL — LOW (ref 3.5–5)
ALP SERPL-CCNC: 54 U/L — SIGNIFICANT CHANGE UP (ref 40–120)
ALT FLD-CCNC: 40 U/L DA — SIGNIFICANT CHANGE UP (ref 10–60)
ANION GAP SERPL CALC-SCNC: 8 MMOL/L — SIGNIFICANT CHANGE UP (ref 5–17)
APTT BLD: 29.4 SEC — SIGNIFICANT CHANGE UP (ref 27.5–35.5)
AST SERPL-CCNC: 27 U/L — SIGNIFICANT CHANGE UP (ref 10–40)
BASE EXCESS BLDV CALC-SCNC: 4.3 MMOL/L — SIGNIFICANT CHANGE UP
BASOPHILS # BLD AUTO: 0.03 K/UL — SIGNIFICANT CHANGE UP (ref 0–0.2)
BASOPHILS NFR BLD AUTO: 0.2 % — SIGNIFICANT CHANGE UP (ref 0–2)
BILIRUB SERPL-MCNC: 0.7 MG/DL — SIGNIFICANT CHANGE UP (ref 0.2–1.2)
BUN SERPL-MCNC: 10 MG/DL — SIGNIFICANT CHANGE UP (ref 7–18)
CALCIUM SERPL-MCNC: 8.8 MG/DL — SIGNIFICANT CHANGE UP (ref 8.4–10.5)
CHLORIDE SERPL-SCNC: 95 MMOL/L — LOW (ref 96–108)
CO2 SERPL-SCNC: 29 MMOL/L — SIGNIFICANT CHANGE UP (ref 22–31)
CREAT SERPL-MCNC: 1.21 MG/DL — SIGNIFICANT CHANGE UP (ref 0.5–1.3)
CULTURE RESULTS: SIGNIFICANT CHANGE UP
D DIMER BLD IA.RAPID-MCNC: 445 NG/ML DDU — HIGH
EOSINOPHIL # BLD AUTO: 0.03 K/UL — SIGNIFICANT CHANGE UP (ref 0–0.5)
EOSINOPHIL NFR BLD AUTO: 0.2 % — SIGNIFICANT CHANGE UP (ref 0–6)
FIBRINOGEN PPP-MCNC: 1016 MG/DL — HIGH (ref 290–520)
GLUCOSE SERPL-MCNC: 122 MG/DL — HIGH (ref 70–99)
HCO3 BLDV-SCNC: 27 MMOL/L — SIGNIFICANT CHANGE UP (ref 22–29)
HCT VFR BLD CALC: 44.9 % — SIGNIFICANT CHANGE UP (ref 39–50)
HGB BLD-MCNC: 15.3 G/DL — SIGNIFICANT CHANGE UP (ref 13–17)
HOROWITZ INDEX BLDV+IHG-RTO: 21 — SIGNIFICANT CHANGE UP
IMM GRANULOCYTES NFR BLD AUTO: 0.7 % — SIGNIFICANT CHANGE UP (ref 0–1.5)
INR BLD: 1.28 RATIO — HIGH (ref 0.88–1.16)
LACTATE SERPL-SCNC: 1.4 MMOL/L — SIGNIFICANT CHANGE UP (ref 0.7–2)
LDH SERPL L TO P-CCNC: 537 U/L — HIGH (ref 120–225)
LYMPHOCYTES # BLD AUTO: 0.96 K/UL — LOW (ref 1–3.3)
LYMPHOCYTES # BLD AUTO: 7.1 % — LOW (ref 13–44)
MCHC RBC-ENTMCNC: 28.6 PG — SIGNIFICANT CHANGE UP (ref 27–34)
MCHC RBC-ENTMCNC: 34.1 GM/DL — SIGNIFICANT CHANGE UP (ref 32–36)
MCV RBC AUTO: 83.9 FL — SIGNIFICANT CHANGE UP (ref 80–100)
MONOCYTES # BLD AUTO: 0.6 K/UL — SIGNIFICANT CHANGE UP (ref 0–0.9)
MONOCYTES NFR BLD AUTO: 4.4 % — SIGNIFICANT CHANGE UP (ref 2–14)
NEUTROPHILS # BLD AUTO: 11.85 K/UL — HIGH (ref 1.8–7.4)
NEUTROPHILS NFR BLD AUTO: 87.4 % — HIGH (ref 43–77)
NRBC # BLD: 0 /100 WBCS — SIGNIFICANT CHANGE UP (ref 0–0)
PCO2 BLDV: 34 MMHG — LOW (ref 42–55)
PH BLDV: 7.51 — HIGH (ref 7.32–7.43)
PLATELET # BLD AUTO: 203 K/UL — SIGNIFICANT CHANGE UP (ref 150–400)
PO2 BLDV: 49 MMHG — SIGNIFICANT CHANGE UP
POTASSIUM SERPL-MCNC: 4.1 MMOL/L — SIGNIFICANT CHANGE UP (ref 3.5–5.3)
POTASSIUM SERPL-SCNC: 4.1 MMOL/L — SIGNIFICANT CHANGE UP (ref 3.5–5.3)
PROT SERPL-MCNC: 8 G/DL — SIGNIFICANT CHANGE UP (ref 6–8.3)
PROTHROM AB SERPL-ACNC: 15 SEC — HIGH (ref 10.6–13.6)
RBC # BLD: 5.35 M/UL — SIGNIFICANT CHANGE UP (ref 4.2–5.8)
RBC # FLD: 12.5 % — SIGNIFICANT CHANGE UP (ref 10.3–14.5)
SAO2 % BLDV: 88.3 % — SIGNIFICANT CHANGE UP
SARS-COV-2 RNA SPEC QL NAA+PROBE: DETECTED
SODIUM SERPL-SCNC: 132 MMOL/L — LOW (ref 135–145)
SPECIMEN SOURCE: SIGNIFICANT CHANGE UP
WBC # BLD: 13.56 K/UL — HIGH (ref 3.8–10.5)
WBC # FLD AUTO: 13.56 K/UL — HIGH (ref 3.8–10.5)

## 2021-11-25 PROCEDURE — 99291 CRITICAL CARE FIRST HOUR: CPT

## 2021-11-25 PROCEDURE — 71045 X-RAY EXAM CHEST 1 VIEW: CPT | Mod: 26

## 2021-11-25 PROCEDURE — 99223 1ST HOSP IP/OBS HIGH 75: CPT

## 2021-11-25 RX ORDER — CEFTRIAXONE 500 MG/1
1000 INJECTION, POWDER, FOR SOLUTION INTRAMUSCULAR; INTRAVENOUS ONCE
Refills: 0 | Status: COMPLETED | OUTPATIENT
Start: 2021-11-25 | End: 2021-11-25

## 2021-11-25 RX ORDER — PANTOPRAZOLE SODIUM 20 MG/1
40 TABLET, DELAYED RELEASE ORAL
Refills: 0 | Status: DISCONTINUED | OUTPATIENT
Start: 2021-11-25 | End: 2021-12-03

## 2021-11-25 RX ORDER — ONDANSETRON 8 MG/1
4 TABLET, FILM COATED ORAL EVERY 8 HOURS
Refills: 0 | Status: DISCONTINUED | OUTPATIENT
Start: 2021-11-25 | End: 2021-12-03

## 2021-11-25 RX ORDER — SODIUM CHLORIDE 9 MG/ML
1000 INJECTION INTRAMUSCULAR; INTRAVENOUS; SUBCUTANEOUS
Refills: 0 | Status: DISCONTINUED | OUTPATIENT
Start: 2021-11-25 | End: 2021-11-26

## 2021-11-25 RX ORDER — DEXAMETHASONE 0.5 MG/5ML
6 ELIXIR ORAL ONCE
Refills: 0 | Status: COMPLETED | OUTPATIENT
Start: 2021-11-25 | End: 2021-11-25

## 2021-11-25 RX ORDER — SODIUM CHLORIDE 9 MG/ML
2500 INJECTION INTRAMUSCULAR; INTRAVENOUS; SUBCUTANEOUS ONCE
Refills: 0 | Status: COMPLETED | OUTPATIENT
Start: 2021-11-25 | End: 2021-11-25

## 2021-11-25 RX ORDER — REMDESIVIR 5 MG/ML
200 INJECTION INTRAVENOUS EVERY 24 HOURS
Refills: 0 | Status: DISCONTINUED | OUTPATIENT
Start: 2021-11-25 | End: 2021-11-26

## 2021-11-25 RX ORDER — ENOXAPARIN SODIUM 100 MG/ML
40 INJECTION SUBCUTANEOUS DAILY
Refills: 0 | Status: DISCONTINUED | OUTPATIENT
Start: 2021-11-25 | End: 2021-11-26

## 2021-11-25 RX ORDER — AZITHROMYCIN 500 MG/1
500 TABLET, FILM COATED ORAL ONCE
Refills: 0 | Status: COMPLETED | OUTPATIENT
Start: 2021-11-25 | End: 2021-11-25

## 2021-11-25 RX ORDER — REMDESIVIR 5 MG/ML
INJECTION INTRAVENOUS
Refills: 0 | Status: DISCONTINUED | OUTPATIENT
Start: 2021-11-25 | End: 2021-11-26

## 2021-11-25 RX ORDER — DEXAMETHASONE 0.5 MG/5ML
6 ELIXIR ORAL DAILY
Refills: 0 | Status: DISCONTINUED | OUTPATIENT
Start: 2021-11-26 | End: 2021-12-03

## 2021-11-25 RX ORDER — ALBUTEROL 90 UG/1
2 AEROSOL, METERED ORAL EVERY 6 HOURS
Refills: 0 | Status: DISCONTINUED | OUTPATIENT
Start: 2021-11-25 | End: 2021-12-03

## 2021-11-25 RX ORDER — KETOROLAC TROMETHAMINE 30 MG/ML
30 SYRINGE (ML) INJECTION ONCE
Refills: 0 | Status: DISCONTINUED | OUTPATIENT
Start: 2021-11-25 | End: 2021-11-25

## 2021-11-25 RX ORDER — ACETAMINOPHEN 500 MG
650 TABLET ORAL EVERY 6 HOURS
Refills: 0 | Status: DISCONTINUED | OUTPATIENT
Start: 2021-11-25 | End: 2021-12-03

## 2021-11-25 RX ADMIN — CEFTRIAXONE 100 MILLIGRAM(S): 500 INJECTION, POWDER, FOR SOLUTION INTRAMUSCULAR; INTRAVENOUS at 20:42

## 2021-11-25 RX ADMIN — SODIUM CHLORIDE 2500 MILLILITER(S): 9 INJECTION INTRAMUSCULAR; INTRAVENOUS; SUBCUTANEOUS at 20:43

## 2021-11-25 RX ADMIN — Medication 30 MILLIGRAM(S): at 20:42

## 2021-11-25 RX ADMIN — CEFTRIAXONE 1000 MILLIGRAM(S): 500 INJECTION, POWDER, FOR SOLUTION INTRAMUSCULAR; INTRAVENOUS at 21:11

## 2021-11-25 RX ADMIN — Medication 30 MILLIGRAM(S): at 20:43

## 2021-11-25 RX ADMIN — AZITHROMYCIN 500 MILLIGRAM(S): 500 TABLET, FILM COATED ORAL at 21:11

## 2021-11-25 RX ADMIN — AZITHROMYCIN 255 MILLIGRAM(S): 500 TABLET, FILM COATED ORAL at 20:42

## 2021-11-25 RX ADMIN — Medication 6 MILLIGRAM(S): at 20:42

## 2021-11-25 NOTE — H&P ADULT - NSHPREVIEWOFSYSTEMS_GEN_ALL_CORE
Constitutional- no fever, chills, weight loss, weight gain or generalized weakness  Eyes – no vision loss or changes, no pain, no redness  ENT  – No hearing changes, no tinnitus, no discharge, no pain  - No runny nose, no congestion, no pain  - No sore throat, no hoarseness, no mouth sores, no voice change  CVS – No chest pain/no palpitations, or SOB  Respiratory - no cough, hemoptysis, wheezing, or SOB  GI – no dysphagia, heartburn, nausea, anorexia, emesis, diarrhea, abd pain, or change in bowel habits   – no frequency, urgency, hesitancy, nocturia, discharge, or weak stream  Skin – no rashes, lumps, or pruritus  AGUSTIN – no joint pain, stiffness, back pain, redness or swelling in joints  Psych – no confusion, depression , anxiety, or insomnia  Neuro – no headache dizziness, syncope, seizures, tremors, numbness, amnesia, or falls  Endocrine – no cold, heat intolerance, sweating, excessive hunger or thirst

## 2021-11-25 NOTE — ED ADULT NURSE NOTE - NS ED NURSE LEVEL OF CONSCIOUSNESS SPEECH
Eat healthy foods you enjoy. Apixaban/Eliquis DOES NOT have a special diet. Limit your alcohol intake.
Speaking Coherently

## 2021-11-25 NOTE — ED PROVIDER NOTE - OBJECTIVE STATEMENT
35 y.o. male c/o fever, chills, diarrhea watery stool 4-5 times/day, lower back pain, dry cough since last Thursday.  Pt was admitted on 11/23, d/c home 11/24 for COVID, pt's not vaccinated.  Pt now c/o not feeling better, sob, fever, no appetite, no vomiting.  Pt continues with watery stool, no BRBPR.  Pt took tylenol, last dose this am.

## 2021-11-25 NOTE — H&P ADULT - PROBLEM SELECTOR PLAN 1
- On admission, patient with cough, sob, fever, leucocytosis, tachycardia  -- SIRS criteria   - CXR showed worsening bilateral patchy opacities, EKG NSR  - COVID19 PCR +ve on 11/23  - F/u COVID19 AB, LDH, Procalcitonin, ferritin, D-Dimer, CRP, ESR    - Tylenol, Albuterol Inhaler, Robitussin PRN  - Monitor respiratory status   - Started on Decadron 6 mg IV daily for 10 days and Remdesivir   - Oxygen supplementation to keep O2 sat >92%  - Isolation precautions

## 2021-11-25 NOTE — H&P ADULT - HISTORY OF PRESENT ILLNESS
35 year old man with no significant past medical hx came to ED c/o worsening cough, sob, fever, chills, headache, nausea, vomiting, diarrhea, poor oral intake. Patient is unvaccinated for COVID19, tested positive on 11/23. Patient denies palpitations, chest pain, bleeding, hemoptysis, abdominal pain or dizziness.   Of note, patient was admitted on 11/23 for COVID19 infection and discharged on 11/24 however decided to come back for further management, now hypoxic to 88-89% on ambulation.

## 2021-11-25 NOTE — ED PROVIDER NOTE - PROGRESS NOTE DETAILS
walked pt within short distance- O2 sat 89-90%,  pt with COVID, hypoxemia, will admit, case d/w Dr. Cook

## 2021-11-25 NOTE — ED ADULT TRIAGE NOTE - BMI (KG/M2)
Progress Note (short form)





- Note


Progress Note: 





POD #1 s/p  robotic assited laparoscopic CANDELARIO/BSO with pelvic washings, lymph 

node disection and omentectomy





Alert. Resting comfortably in bed. States she has minimal incisional 

tenderness. Adequate pain control w/ medications ordered. She hasn't been OOB 

yet. Underwood cath still in place. Using her Incentive spirometer as directed. 

States she ate some toast last night and tolerated. Denies n/v/f/c, CP, 

palpitations, SOB or MATHEWS.





 Last Vital Signs











Temp Pulse Resp BP Pulse Ox


 


 98.3 F   90   20   125/59 L  98 


 


 04/24/19 05:50  04/24/19 05:50  04/24/19 05:50  04/24/19 05:50  04/24/19 05:59








 CBC





 04/24/19 06:15 





Gen: nad


ABD: obese habitus. all surgical ports c/d/i. no hematoma


: underwood to gravit (clear)


LE: SCDs bilat. soft. nt





Problem List





- Problems


(1) Endometrial cancer


Assessment/Plan: 


POD #1 s/p  robotic assited laparoscopic CANDELARIO/BSO with pelvic washings, lymph 

node disection and omentectomy.





Regular diet


DC underwood and begin trial of void


OOB and ambulate


Home scripts:


   Xarelto 10mg: take 1 tab PO daily x 30 days


   Oxycodone 5mg: take 1 tab every 6 hours as needed for pain


Above plan discussed with Dr. Lujan and agrees. 


Code(s): C54.1 - MALIGNANT NEOPLASM OF ENDOMETRIUM
34.7

## 2021-11-25 NOTE — ED PROVIDER NOTE - CARE PLAN
1 Principal Discharge DX:	Hypoxemia  Secondary Diagnosis:	2019 novel coronavirus disease (COVID-19)

## 2021-11-25 NOTE — H&P ADULT - ATTENDING COMMENTS
Patient is a 35 year old male with a PMH of COVID Infection dx 11/23/2021 who presented with a chief complaint of shortness of breath.  Patient states that beginning nearly a week prior to current presentation he began to experience progressively worsening shortness of breath associated with a non-productive cough.    Of note, patient was recently admitted to [facility] on 11/25/2021 and subsequently discharged on 11/24/2021.  Patient states that upon returning home he continued to experience worsening shortness of breath for which he decided to return to [facility] ED for further management.    Additionally of note, patient states that although his wife is fully vaccinated he has not yet initiated his vaccination series for COVID because he did not want to be sick at the same time as his wife.        P/E: As above MAR    A/P:    Shortness of Breath due to Severe COVID Infection:  -COVID= Detected (on 11/23/2021)  -SIRS Criteria=  4 (WBC>12,000, Temp>100.4, HR>90, RR>20) + source of infection (Lungs)  -At time of examination in the ED, patient is requiring supplemental oxygen.  Therefore, patient can be categorized as Severe Disease.  -Will start patient on Lovenox  -In addition, will start patient on Dexamethasone 6mg IV Daily for 9 days (as he was recently hospitalized) or until discharge (whichever is shorter)  -Will also start patient on Albuterol MDI Q2H PRN as well as Mucinex PRN and Robitussin PRN  -Will send ESR, CRP, Procalcitonin  -Will continue to trend d-dimer, CRP, LDH, Troponin, Ferritin, CPK  -Tylenol PRN for fever  -Will continue to provide patient with any and all additional supportive care as necessary  -Will ask Infectious Disease to evaluate patient for further recommendations, such as Remdesivir    Uncontrolled Blood Glucose:  -Hemoglobin A1c with AM labs  -Blood Glucose Monitoring ACHS  -Regular Insulin Slidign Scale ACHS  -Carb Controlled, Heart Healthy diet as tolerated    Hypoalbuminemia:  -Nutrition Consult    GI/DVT PPx:  -Pepcid  -Lovenox Patient is a 35 year old male with a PMH of COVID Infection dx 11/23/2021 who presented with a chief complaint of shortness of breath.  Patient states that beginning nearly a week prior to current presentation he began to experience progressively worsening shortness of breath associated with a non-productive cough.    Of note, patient was recently admitted to [facility] on 11/25/2021 and subsequently discharged on 11/24/2021.  Patient states that upon returning home he continued to experience worsening shortness of breath for which he decided to return to [facility] ED for further management.    Additionally of note, patient states that although his wife is fully vaccinated he has not yet initiated his vaccination series for COVID because he did not want to be sick at the same time as his wife.    T(C): 39.5 (11-25-21 @ 19:25), Max: 39.5 (11-25-21 @ 19:25)  T(F): 103.1 (11-25-21 @ 19:25), Max: 103.1 (11-25-21 @ 19:25)  HR: 124 (11-25-21 @ 18:45) (124 - 124)  BP: 102/67 (11-25-21 @ 18:45) (102/67 - 102/67)  RR: 22 (11-25-21 @ 18:45) (22 - 22)  SpO2: 92% (11-25-21 @ 18:45) (92% - 92%)  Wt(kg): --    P/E: As above MAR    A/P:    Shortness of Breath due to Severe COVID Infection:  -COVID= Detected (on 11/23/2021)  -SIRS Criteria=  4 (WBC>12,000, Temp>100.4, HR>90, RR>20) + source of infection (Lungs)  -At time of examination in the ED, patient is requiring supplemental oxygen.  Therefore, patient can be categorized as Severe Disease.  -Will start patient on Lovenox  -In addition, will start patient on Dexamethasone 6mg IV Daily for 9 days (as he was recently hospitalized) or until discharge (whichever is shorter)  -Will also start patient on Albuterol MDI Q2H PRN as well as Mucinex PRN and Robitussin PRN  -Will send ESR, CRP, Procalcitonin  -Will continue to trend d-dimer, CRP, LDH, Troponin, Ferritin, CPK  -Tylenol PRN for fever  -Will continue to provide patient with any and all additional supportive care as necessary  -Will ask Infectious Disease to evaluate patient for further recommendations, such as Remdesivir    Uncontrolled Blood Glucose:  -Hemoglobin A1c with AM labs  -Blood Glucose Monitoring ACHS  -Regular Insulin Slidign Scale ACHS  -Carb Controlled, Heart Healthy diet as tolerated    Hypoalbuminemia:  -Nutrition Consult    GI/DVT PPx:  -Pepcid  -Lovenox Patient is a 35 year old male with a PMH of COVID Infection dx 11/23/2021 who presented with a chief complaint of shortness of breath.  Patient states that beginning nearly a week prior to current presentation he began to experience progressively worsening shortness of breath associated with a non-productive cough.    Of note, patient was recently admitted to [facility] on 11/25/2021 and subsequently discharged on 11/24/2021.  Patient states that upon returning home he continued to experience worsening shortness of breath for which he decided to return to [facility] ED for further management.    Additionally of note, patient states that although his wife is fully vaccinated he has not yet initiated his vaccination series for COVID because he did not want to be sick at the same time as his wife.    T(C): 39.5 (11-25-21 @ 19:25), Max: 39.5 (11-25-21 @ 19:25)  T(F): 103.1 (11-25-21 @ 19:25), Max: 103.1 (11-25-21 @ 19:25)  HR: 124 (11-25-21 @ 18:45) (124 - 124)  BP: 102/67 (11-25-21 @ 18:45) (102/67 - 102/67)  RR: 22 (11-25-21 @ 18:45) (22 - 22)  SpO2: 92% (11-25-21 @ 18:45) (92% - 92%)  Wt(kg): --    P/E: As above MAR    A/P:    Shortness of Breath due to Severe COVID Infection:  -COVID= Detected (on 11/23/2021)  -Chest film, though portable, appears to demonstrate bilateral patchy densities (slightly worsened relative to chest film from 11/23/2021), though no obvious organized collection potentially suggestive of a bacterial pneumonia  -SIRS Criteria=  4 (WBC>12,000, Temp>100.4, HR>90, RR>20) + source of infection (Lungs)  -At time of examination in the ED, patient is requiring supplemental oxygen.  Therefore, patient can be categorized as Severe Disease.  -Will start patient on Lovenox  -In addition, will start patient on Dexamethasone 6mg IV Daily for 9 days (as he was recently hospitalized) or until discharge (whichever is shorter)  -Will also start patient on Albuterol MDI Q2H PRN as well as Mucinex PRN and Robitussin PRN  -Will send ESR, CRP, Procalcitonin  -Will continue to trend d-dimer, CRP, LDH, Troponin, Ferritin, CPK  -Tylenol PRN for fever  -Will continue to provide patient with any and all additional supportive care as necessary  -Although will hold further antimicrobials for now, if patient demonstrates no significant improvement, can consider also treating for possible superimposed HCAP    Uncontrolled Blood Glucose:  -Hemoglobin A1c with AM labs  -Blood Glucose Monitoring ACHS  -Regular Insulin Slidign Scale ACHS  -Carb Controlled, Heart Healthy diet as tolerated    Hypoalbuminemia:  -Nutrition Consult    GI/DVT PPx:  -Pepcid  -Lovenox Patient is a 35 year old male with a PMH of COVID Infection dx 11/23/2021 who presented with a chief complaint of shortness of breath.  Patient states that beginning nearly a week prior to current presentation he began to experience progressively worsening shortness of breath associated with a non-productive cough.    Of note, patient was recently admitted to [facility] on 11/25/2021 and subsequently discharged on 11/24/2021.  Patient states that upon returning home he continued to experience worsening shortness of breath for which he decided to return to [facility] ED for further management.    Additionally of note, patient states that although his wife is fully vaccinated he has not yet initiated his vaccination series for COVID because he did not want to be sick at the same time as his wife.    T(C): 39.5 (11-25-21 @ 19:25), Max: 39.5 (11-25-21 @ 19:25)  T(F): 103.1 (11-25-21 @ 19:25), Max: 103.1 (11-25-21 @ 19:25)  HR: 124 (11-25-21 @ 18:45) (124 - 124)  BP: 102/67 (11-25-21 @ 18:45) (102/67 - 102/67)  RR: 22 (11-25-21 @ 18:45) (22 - 22)  SpO2: 92% (11-25-21 @ 18:45) (92% - 92%)  Wt(kg): --    P/E: As above MAR    A/P:    Shortness of Breath due to Severe COVID Infection:  -COVID= Detected (on 11/23/2021)  -Chest film, though portable, appears to demonstrate bilateral patchy densities (slightly worsened relative to chest film from 11/23/2021), though no obvious organized collection potentially suggestive of a bacterial pneumonia  -SIRS Criteria=  4 (WBC>12,000, Temp>100.4, HR>90, RR>20) + source of infection (Lungs)  -At time of examination in the ED, patient is requiring supplemental oxygen.  Therefore, patient can be categorized as Severe Disease.  -Will start patient on Lovenox  -In addition, will start patient on Dexamethasone 6mg IV Daily for 9 days (as he was recently hospitalized) or until discharge (whichever is shorter)  -Will also start patient on Albuterol MDI Q2H PRN as well as Mucinex PRN and Robitussin PRN  -Will send ESR, CRP, Procalcitonin  -Will continue to trend d-dimer, CRP, LDH, Troponin, Ferritin, CPK  -Tylenol PRN for fever  -Will continue to provide patient with any and all additional supportive care as necessary  -Although will hold further antimicrobials for now, if patient demonstrates no significant improvement, can consider also treating for possible superimposed HCAP    Uncontrolled Blood Glucose:  -Hemoglobin A1c with AM labs  -Blood Glucose Monitoring ACHS  -Regular Insulin Slidign Scale ACHS  -Carb Controlled, Heart Healthy diet as tolerated    Mild Hyponatremia:  -Sodium, corrected= 133mEq/L  -Will continue with IVF hydration    Hypoalbuminemia:  -Nutrition Consult    GI/DVT PPx:  -Pepcid  -Lovenox Patient is a 35 year old male with a PMH of COVID Infection dx 11/23/2021 who presented with a chief complaint of shortness of breath.  Patient states that beginning nearly a week prior to current presentation he began to experience progressively worsening shortness of breath associated with a non-productive cough.    Of note, patient was recently admitted to [facility] on 11/25/2021 and subsequently discharged on 11/24/2021.  Patient states that upon returning home he continued to experience worsening shortness of breath for which he decided to return to [facility] ED for further management.    Additionally of note, patient states that although his wife is fully vaccinated he has not yet initiated his vaccination series for COVID because he did not want to be sick at the same time as his wife.    T(C): 39.5 (11-25-21 @ 19:25), Max: 39.5 (11-25-21 @ 19:25)  T(F): 103.1 (11-25-21 @ 19:25), Max: 103.1 (11-25-21 @ 19:25)  HR: 124 (11-25-21 @ 18:45) (124 - 124)  BP: 102/67 (11-25-21 @ 18:45) (102/67 - 102/67)  RR: 22 (11-25-21 @ 18:45) (22 - 22)  SpO2: 92% (11-25-21 @ 18:45) (92% - 92%)  Wt(kg): --    P/E: As above MAR    A/P:    Shortness of Breath due to Severe COVID Infection:  -COVID= Detected (on 11/23/2021)  -Chest film, though portable, appears to demonstrate bilateral patchy densities (slightly worsened relative to chest film from 11/23/2021), though no obvious organized collection potentially suggestive of a bacterial pneumonia  -SIRS Criteria=  4 (WBC>12,000, Temp>100.4, HR>90, RR>20) + source of infection (Lungs)  -At time of examination in the ED, patient is requiring supplemental oxygen.  Therefore, patient can be categorized as Severe Disease.  -Will start patient on Lovenox  -In addition, will start patient on Dexamethasone 6mg IV Daily for 9 days (as he was recently hospitalized) or until discharge (whichever is shorter) and Remdesivir  -Will also start patient on Albuterol MDI Q2H PRN as well as Mucinex PRN and Robitussin PRN  -Will send ESR, CRP, Procalcitonin  -Will continue to trend d-dimer, CRP, LDH, Troponin, Ferritin, CPK  -Tylenol PRN for fever  -Will continue to provide patient with any and all additional supportive care as necessary  -Although will hold further antimicrobials for now, if patient demonstrates no significant improvement, can consider also treating for possible superimposed HCAP    Uncontrolled Blood Glucose:  -Hemoglobin A1c with AM labs  -Blood Glucose Monitoring ACHS  -Regular Insulin Slidign Scale ACHS  -Carb Controlled, Heart Healthy diet as tolerated    Mild Hyponatremia:  -Sodium, corrected= 133mEq/L  -Will continue with IVF hydration    Hypoalbuminemia:  -Nutrition Consult    GI/DVT PPx:  -Pepcid  -Lovenox Patient is a 35 year old male with a PMH of COVID Infection dx 11/23/2021 who presented with a chief complaint of shortness of breath.  Patient states that beginning nearly a week prior to current presentation he began to experience progressively worsening shortness of breath associated with a non-productive cough.    Of note, patient was recently admitted to [facility] on 11/25/2021 and subsequently discharged on 11/24/2021.  Patient states that upon returning home he continued to experience worsening shortness of breath for which he decided to return to [facility] ED for further management.    Additionally of note, patient states that although his wife is fully vaccinated he has not yet initiated his vaccination series for COVID because he did not want to be sick at the same time as his wife.    T(C): 39.5 (11-25-21 @ 19:25), Max: 39.5 (11-25-21 @ 19:25)  T(F): 103.1 (11-25-21 @ 19:25), Max: 103.1 (11-25-21 @ 19:25)  HR: 124 (11-25-21 @ 18:45) (124 - 124)  BP: 102/67 (11-25-21 @ 18:45) (102/67 - 102/67)  RR: 22 (11-25-21 @ 18:45) (22 - 22)  SpO2: 92% (11-25-21 @ 18:45) (92% - 92%)  Wt(kg): --    P/E: As above MAR    A/P:    Shortness of Breath due to Severe COVID Infection:  -COVID= Detected (on 11/23/2021)  -Chest film, though portable, appears to demonstrate bilateral patchy densities (slightly worsened relative to chest film from 11/23/2021), though no obvious organized collection potentially suggestive of a bacterial pneumonia  -SIRS Criteria=  4 (WBC>12,000, Temp>100.4, HR>90, RR>20) + source of infection (Lungs)  -At time of examination in the ED, patient is requiring supplemental oxygen.  Therefore, patient can be categorized as Severe Disease.  -Will start patient on Lovenox  -In addition, will start patient on Dexamethasone 6mg IV Daily for 9 days (as he was recently hospitalized) or until discharge (whichever is shorter) and Remdesivir  -Will also start patient on Albuterol MDI Q2H PRN as well as Mucinex PRN and Robitussin PRN  -Will send ESR, CRP, Procalcitonin  -Will continue to trend d-dimer, CRP, LDH, Troponin, Ferritin, CPK  -Tylenol PRN for fever  -Will continue to provide patient with any and all additional supportive care as necessary  -Although will hold further antimicrobials for now, if patient demonstrates no significant improvement, can consider also treating for possible superimposed HCAP  -Will maintain a low-threshold for ICU consultation    Uncontrolled Blood Glucose:  -Hemoglobin A1c with AM labs  -Blood Glucose Monitoring ACHS  -Regular Insulin Slidign Scale ACHS  -Carb Controlled, Heart Healthy diet as tolerated    Mild Hyponatremia:  -Sodium, corrected= 133mEq/L  -Will continue with IVF hydration    Hypoalbuminemia:  -Nutrition Consult    GI/DVT PPx:  -Pepcid  -Lovenox

## 2021-11-25 NOTE — ED ADULT NURSE NOTE - NSIMPLEMENTINTERV_GEN_ALL_ED
Implemented All Universal Safety Interventions:  [place] to call system. Call bell, personal items and telephone within reach. Instruct patient to call for assistance. Room bathroom lighting operational. Non-slip footwear when patient is off stretcher. Physically safe environment: no spills, clutter or unnecessary equipment. Stretcher in lowest position, wheels locked, appropriate side rails in place.
Simple: Patient demonstrates quick and easy understanding

## 2021-11-25 NOTE — H&P ADULT - ASSESSMENT
35 year old man with no significant past medical hx is admitted to medicine for AHRF 2/2 COVID19 PNA

## 2021-11-26 DIAGNOSIS — Z29.9 ENCOUNTER FOR PROPHYLACTIC MEASURES, UNSPECIFIED: ICD-10-CM

## 2021-11-26 DIAGNOSIS — U07.1 COVID-19: ICD-10-CM

## 2021-11-26 PROBLEM — Z78.9 OTHER SPECIFIED HEALTH STATUS: Chronic | Status: ACTIVE | Noted: 2021-11-23

## 2021-11-26 LAB
ALBUMIN SERPL ELPH-MCNC: 2.4 G/DL — LOW (ref 3.5–5)
ALP SERPL-CCNC: 51 U/L — SIGNIFICANT CHANGE UP (ref 40–120)
ALT FLD-CCNC: 37 U/L DA — SIGNIFICANT CHANGE UP (ref 10–60)
ANION GAP SERPL CALC-SCNC: 4 MMOL/L — LOW (ref 5–17)
APPEARANCE UR: CLEAR — SIGNIFICANT CHANGE UP
AST SERPL-CCNC: 19 U/L — SIGNIFICANT CHANGE UP (ref 10–40)
BACTERIA # UR AUTO: ABNORMAL /HPF
BILIRUB DIRECT SERPL-MCNC: 0.1 MG/DL — SIGNIFICANT CHANGE UP (ref 0–0.3)
BILIRUB INDIRECT FLD-MCNC: 0.3 MG/DL — SIGNIFICANT CHANGE UP (ref 0.2–1)
BILIRUB SERPL-MCNC: 0.4 MG/DL — SIGNIFICANT CHANGE UP (ref 0.2–1.2)
BILIRUB UR-MCNC: NEGATIVE — SIGNIFICANT CHANGE UP
BUN SERPL-MCNC: 12 MG/DL — SIGNIFICANT CHANGE UP (ref 7–18)
CALCIUM SERPL-MCNC: 8.6 MG/DL — SIGNIFICANT CHANGE UP (ref 8.4–10.5)
CHLORIDE SERPL-SCNC: 105 MMOL/L — SIGNIFICANT CHANGE UP (ref 96–108)
CO2 SERPL-SCNC: 31 MMOL/L — SIGNIFICANT CHANGE UP (ref 22–31)
COLOR SPEC: YELLOW — SIGNIFICANT CHANGE UP
CREAT SERPL-MCNC: 0.94 MG/DL — SIGNIFICANT CHANGE UP (ref 0.5–1.3)
CRP SERPL-MCNC: 176 MG/L — HIGH
CRP SERPL-MCNC: 189 MG/L — HIGH
D DIMER BLD IA.RAPID-MCNC: 445 NG/ML DDU — HIGH
DIFF PNL FLD: ABNORMAL
EPI CELLS # UR: SIGNIFICANT CHANGE UP /HPF
ERYTHROCYTE [SEDIMENTATION RATE] IN BLOOD: 38 MM/HR — HIGH (ref 0–15)
FERRITIN SERPL-MCNC: 1010 NG/ML — HIGH (ref 30–400)
FERRITIN SERPL-MCNC: 861 NG/ML — HIGH (ref 30–400)
GLUCOSE SERPL-MCNC: 160 MG/DL — HIGH (ref 70–99)
GLUCOSE UR QL: NEGATIVE — SIGNIFICANT CHANGE UP
HCT VFR BLD CALC: 43.9 % — SIGNIFICANT CHANGE UP (ref 39–50)
HGB BLD-MCNC: 14.8 G/DL — SIGNIFICANT CHANGE UP (ref 13–17)
HYALINE CASTS # UR AUTO: ABNORMAL /LPF
INR BLD: 1.14 RATIO — SIGNIFICANT CHANGE UP (ref 0.88–1.16)
KETONES UR-MCNC: NEGATIVE — SIGNIFICANT CHANGE UP
LDH SERPL L TO P-CCNC: 434 U/L — HIGH (ref 120–225)
LEUKOCYTE ESTERASE UR-ACNC: NEGATIVE — SIGNIFICANT CHANGE UP
MAGNESIUM SERPL-MCNC: 2.6 MG/DL — SIGNIFICANT CHANGE UP (ref 1.6–2.6)
MCHC RBC-ENTMCNC: 28.9 PG — SIGNIFICANT CHANGE UP (ref 27–34)
MCHC RBC-ENTMCNC: 33.7 GM/DL — SIGNIFICANT CHANGE UP (ref 32–36)
MCV RBC AUTO: 85.7 FL — SIGNIFICANT CHANGE UP (ref 80–100)
NITRITE UR-MCNC: NEGATIVE — SIGNIFICANT CHANGE UP
NRBC # BLD: 0 /100 WBCS — SIGNIFICANT CHANGE UP (ref 0–0)
PH UR: 7 — SIGNIFICANT CHANGE UP (ref 5–8)
PHOSPHATE SERPL-MCNC: 4 MG/DL — SIGNIFICANT CHANGE UP (ref 2.5–4.5)
PLATELET # BLD AUTO: 191 K/UL — SIGNIFICANT CHANGE UP (ref 150–400)
POTASSIUM SERPL-MCNC: 4.6 MMOL/L — SIGNIFICANT CHANGE UP (ref 3.5–5.3)
POTASSIUM SERPL-SCNC: 4.6 MMOL/L — SIGNIFICANT CHANGE UP (ref 3.5–5.3)
PROCALCITONIN SERPL-MCNC: 0.07 NG/ML — SIGNIFICANT CHANGE UP (ref 0.02–0.1)
PROCALCITONIN SERPL-MCNC: 0.09 NG/ML — SIGNIFICANT CHANGE UP (ref 0.02–0.1)
PROT SERPL-MCNC: 7.5 G/DL — SIGNIFICANT CHANGE UP (ref 6–8.3)
PROT UR-MCNC: 100
PROTHROM AB SERPL-ACNC: 13.5 SEC — SIGNIFICANT CHANGE UP (ref 10.6–13.6)
RBC # BLD: 5.12 M/UL — SIGNIFICANT CHANGE UP (ref 4.2–5.8)
RBC # FLD: 12.7 % — SIGNIFICANT CHANGE UP (ref 10.3–14.5)
RBC CASTS # UR COMP ASSIST: ABNORMAL /HPF (ref 0–2)
SODIUM SERPL-SCNC: 140 MMOL/L — SIGNIFICANT CHANGE UP (ref 135–145)
SP GR SPEC: 1 — LOW (ref 1.01–1.02)
UROBILINOGEN FLD QL: NEGATIVE — SIGNIFICANT CHANGE UP
WBC # BLD: 11.52 K/UL — HIGH (ref 3.8–10.5)
WBC # FLD AUTO: 11.52 K/UL — HIGH (ref 3.8–10.5)
WBC UR QL: SIGNIFICANT CHANGE UP /HPF (ref 0–5)

## 2021-11-26 PROCEDURE — 99233 SBSQ HOSP IP/OBS HIGH 50: CPT

## 2021-11-26 RX ORDER — REMDESIVIR 5 MG/ML
INJECTION INTRAVENOUS
Refills: 0 | Status: DISCONTINUED | OUTPATIENT
Start: 2021-11-26 | End: 2021-11-27

## 2021-11-26 RX ORDER — REMDESIVIR 5 MG/ML
200 INJECTION INTRAVENOUS EVERY 24 HOURS
Refills: 0 | Status: COMPLETED | OUTPATIENT
Start: 2021-11-26 | End: 2021-11-26

## 2021-11-26 RX ORDER — REMDESIVIR 5 MG/ML
100 INJECTION INTRAVENOUS EVERY 24 HOURS
Refills: 0 | Status: DISCONTINUED | OUTPATIENT
Start: 2021-11-27 | End: 2021-11-27

## 2021-11-26 RX ORDER — INFLUENZA VIRUS VACCINE 15; 15; 15; 15 UG/.5ML; UG/.5ML; UG/.5ML; UG/.5ML
0.5 SUSPENSION INTRAMUSCULAR ONCE
Refills: 0 | Status: DISCONTINUED | OUTPATIENT
Start: 2021-11-26 | End: 2021-12-03

## 2021-11-26 RX ORDER — ENOXAPARIN SODIUM 100 MG/ML
40 INJECTION SUBCUTANEOUS EVERY 12 HOURS
Refills: 0 | Status: DISCONTINUED | OUTPATIENT
Start: 2021-11-26 | End: 2021-11-27

## 2021-11-26 RX ADMIN — ENOXAPARIN SODIUM 40 MILLIGRAM(S): 100 INJECTION SUBCUTANEOUS at 12:10

## 2021-11-26 RX ADMIN — ENOXAPARIN SODIUM 40 MILLIGRAM(S): 100 INJECTION SUBCUTANEOUS at 17:28

## 2021-11-26 RX ADMIN — SODIUM CHLORIDE 100 MILLILITER(S): 9 INJECTION INTRAMUSCULAR; INTRAVENOUS; SUBCUTANEOUS at 05:18

## 2021-11-26 RX ADMIN — PANTOPRAZOLE SODIUM 40 MILLIGRAM(S): 20 TABLET, DELAYED RELEASE ORAL at 08:37

## 2021-11-26 RX ADMIN — REMDESIVIR 200 MILLIGRAM(S): 5 INJECTION INTRAVENOUS at 01:45

## 2021-11-26 RX ADMIN — Medication 6 MILLIGRAM(S): at 05:16

## 2021-11-26 NOTE — PROGRESS NOTE ADULT - SUBJECTIVE AND OBJECTIVE BOX
Patient is a 35y old  Male who presents with a chief complaint of AHRF 2/2 COVID19 PNA (2021 21:44)    INTERVAL HPI/OVERNIGHT EVENTS: no new complaints    REVIEW OF SYSTEMS:  CONSTITUTIONAL: No fever, chills  ENMT:  No difficulty hearing, no change in vision  NECK: No pain or stiffness  RESPIRATORY: No cough, SOB  CARDIOVASCULAR: No chest pain, palpitations  GASTROINTESTINAL: No abdominal pain. No nausea, vomiting, or diarrhea  GENITOURINARY: No dysuria  NEUROLOGICAL: No HA  SKIN: No itching, burning, rashes, or lesions   LYMPH NODES: No enlarged glands  ENDOCRINE: No heat or cold intolerance; No hair loss  MUSCULOSKELETAL: No joint pain or swelling; No muscle, back, or extremity pain  PSYCHIATRIC: No depression, anxiety  HEME/LYMPH: No easy bruising, or bleeding gums    T(C): 35.7 (21 @ 09:30), Max: 39.5 (21 @ 19:25)  HR: 74 (21 @ 09:30) (74 - 124)  BP: 121/72 (21 @ 09:30) (90/54 - 121/72)  RR: 22 (21 @ 09:30) (18 - 22)  SpO2: 93% (21 @ 09:30) (92% - 98%)  Wt(kg): --Vital Signs Last 24 Hrs  T(C): 35.7 (2021 09:30), Max: 39.5 (2021 19:25)  T(F): 96.3 (2021 09:30), Max: 103.1 (2021 19:25)  HR: 74 (2021 09:30) (74 - 124)  BP: 121/72 (2021 09:30) (90/54 - 121/72)  BP(mean): --  RR: 22 (2021 09:30) (18 - 22)  SpO2: 93% (2021 09:30) (92% - 98%)    MEDICATIONS  (STANDING):  dexAMETHasone  Injectable 6 milliGRAM(s) IV Push daily  enoxaparin Injectable 40 milliGRAM(s) SubCutaneous daily  influenza   Vaccine 0.5 milliLiter(s) IntraMuscular once  pantoprazole    Tablet 40 milliGRAM(s) Oral before breakfast  remdesivir  IVPB   IV Intermittent     MEDICATIONS  (PRN):  acetaminophen     Tablet .. 650 milliGRAM(s) Oral every 6 hours PRN Temp greater or equal to 38C (100.4F), Mild Pain (1 - 3)  ALBUTerol    90 MICROgram(s) HFA Inhaler 2 Puff(s) Inhalation every 6 hours PRN Shortness of Breath and/or Wheezing  guaiFENesin Oral Liquid (Sugar-Free) 200 milliGRAM(s) Oral every 6 hours PRN Cough  ondansetron Injectable 4 milliGRAM(s) IV Push every 8 hours PRN Nausea and/or Vomiting    PHYSICAL EXAM:  GENERAL: NAD  EYES: clear conjunctiva; EOMI  ENMT: Moist mucous membranes  NECK: Supple, No JVD, Normal thyroid  CHEST/LUNG: Clear to auscultation bilaterally; No rales, rhonchi, wheezing, or rubs  HEART: S1, S2, Regular rate and rhythm  ABDOMEN: Soft, Nontender, Nondistended; Bowel sounds present  NEURO: Alert & Oriented X3  EXTREMITIES: No LE edema, no calf tenderness  LYMPH: No lymphadenopathy noted  SKIN: No rashes or lesions    Consultant(s) Notes Reviewed:  [x ] YES  [ ] NO  Care Discussed with Consultants/Other Providers [ x] YES  [ ] NO    LABS:                        14.8   11.52 )-----------( 191      ( 2021 05:36 )             43.9         140  |  105  |  12  ----------------------------<  160<H>  4.6   |  31  |  0.94    Ca    8.6      2021 05:36  Phos  4.0       Mg     2.6         TPro  7.5  /  Alb  2.4<L>  /  TBili  0.4  /  DBili  0.1  /  AST  19  /  ALT  37  /  AlkPhos  51      PT/INR - ( 2021 05:36 )   PT: 13.5 sec;   INR: 1.14 ratio         PTT - ( 2021 20:34 )  PTT:29.4 sec  CAPILLARY BLOOD GLUCOSE      POCT Blood Glucose.: 131 mg/dL (2021 19:13)    Urinalysis Basic - ( 2021 02:04 )    Color: Yellow / Appearance: Clear / S.005 / pH: x  Gluc: x / Ketone: Negative  / Bili: Negative / Urobili: Negative   Blood: x / Protein: 100 / Nitrite: Negative   Leuk Esterase: Negative / RBC: 2-5 /HPF / WBC 0-2 /HPF   Sq Epi: x / Non Sq Epi: Few /HPF / Bacteria: Moderate /HPF    RADIOLOGY & ADDITIONAL TESTS:    Imaging Personally Reviewed:  [x ] YES  [ ] NO  < from: Xray Chest 1 View-PORTABLE IMMEDIATE (21 @ 20:06) >    EXAM:  XR CHEST PORTABLE IMMED 1V                            PROCEDURE DATE:  2021          INTERPRETATION:  PROCEDURE: AP view of the chest.    CLINICAL INFORMATION: Sepsis.    COMPARISON: 2021.    FINDINGS:    Lungs: There are bilateral lung opacities.  Heart: The heart is normal in size.  Mediastinum: The mediastinum is within normal limits.    IMPRESSION:    Findings of bilateral lung opacities are suspicious for pneumonia.    --- End of Report ---      < end of copied text >

## 2021-11-26 NOTE — PATIENT PROFILE ADULT - ARE SIGNIFICANT INDICATORS COMPLETE.
"[facility]  [id number] [place] [address], [address]  [place] [address]    Nima Posey MD    [address] [place] [address]      5/20/2021    Idris Woodson  1963      Dr. Posey    I am writing to ask for your assistance in care coordination for the above mutual client. I have attached a signed SUJATA from Idris in this fax. Idris has been attending the [facility] adult evening program in [place] since his admission April 19.     I have also attached a record of his \"Drug Abuse Urine Screen\" for the last month and I would like to ask for your assistance in determining whether Idris is taking the amphetamine-dextroamphetamine (ADDERALL) 20 MG tablet medications you have prescribed for him \"as prescribed\". I do understand it is not possible to determine a therapeutic level for amphetamines from a UA test, but I am wondering if you have a way to determine an appropriate level of this medication in another way.     I am asking for your assistance because in addition to testing positive recently for amphetamines (prescribed), you can see he is testing positive for THC and Opiates, which are not prescribed. He is also admitting to using alcohol periodically. 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 therapeutic purposes.     Thanks very much for your time. I look forward to your feedback. I can be reached at [phone].         JESUS Simmons  " No Yes

## 2021-11-26 NOTE — PROGRESS NOTE ADULT - ASSESSMENT
35 year old man with no significant past medical hx, Presented with c/o worsening cough, sob, fever, chills, headache, nausea, vomiting, diarrhea, poor oral intake.  CXr with bilat opacity. Admitted to medicine for AHRF 2/2 COVID19 PNA. On Dexamethasone and remdesivir.

## 2021-11-27 LAB
ALBUMIN SERPL ELPH-MCNC: 2.2 G/DL — LOW (ref 3.5–5)
ALBUMIN SERPL ELPH-MCNC: 2.3 G/DL — LOW (ref 3.5–5)
ALP SERPL-CCNC: 49 U/L — SIGNIFICANT CHANGE UP (ref 40–120)
ALP SERPL-CCNC: 51 U/L — SIGNIFICANT CHANGE UP (ref 40–120)
ALT FLD-CCNC: 33 U/L DA — SIGNIFICANT CHANGE UP (ref 10–60)
ALT FLD-CCNC: 34 U/L DA — SIGNIFICANT CHANGE UP (ref 10–60)
ANION GAP SERPL CALC-SCNC: 7 MMOL/L — SIGNIFICANT CHANGE UP (ref 5–17)
AST SERPL-CCNC: 10 U/L — SIGNIFICANT CHANGE UP (ref 10–40)
AST SERPL-CCNC: 12 U/L — SIGNIFICANT CHANGE UP (ref 10–40)
BILIRUB DIRECT SERPL-MCNC: <0.1 MG/DL — SIGNIFICANT CHANGE UP (ref 0–0.3)
BILIRUB INDIRECT FLD-MCNC: >0.3 MG/DL — SIGNIFICANT CHANGE UP (ref 0.2–1)
BILIRUB SERPL-MCNC: 0.4 MG/DL — SIGNIFICANT CHANGE UP (ref 0.2–1.2)
BILIRUB SERPL-MCNC: 0.5 MG/DL — SIGNIFICANT CHANGE UP (ref 0.2–1.2)
BUN SERPL-MCNC: 14 MG/DL — SIGNIFICANT CHANGE UP (ref 7–18)
CALCIUM SERPL-MCNC: 8.9 MG/DL — SIGNIFICANT CHANGE UP (ref 8.4–10.5)
CHLORIDE SERPL-SCNC: 103 MMOL/L — SIGNIFICANT CHANGE UP (ref 96–108)
CO2 SERPL-SCNC: 28 MMOL/L — SIGNIFICANT CHANGE UP (ref 22–31)
COVID-19 NUCLEOCAPSID GAM AB INTERP: POSITIVE
COVID-19 NUCLEOCAPSID TOTAL GAM ANTIBODY RESULT: 7.12 INDEX — HIGH
COVID-19 SPIKE DOMAIN AB INTERP: POSITIVE
COVID-19 SPIKE DOMAIN ANTIBODY RESULT: 40.1 U/ML — HIGH
CREAT SERPL-MCNC: 0.83 MG/DL — SIGNIFICANT CHANGE UP (ref 0.5–1.3)
CULTURE RESULTS: NO GROWTH — SIGNIFICANT CHANGE UP
D DIMER BLD IA.RAPID-MCNC: 271 NG/ML DDU — HIGH
GLUCOSE SERPL-MCNC: 132 MG/DL — HIGH (ref 70–99)
HCT VFR BLD CALC: 41.2 % — SIGNIFICANT CHANGE UP (ref 39–50)
HGB BLD-MCNC: 13.9 G/DL — SIGNIFICANT CHANGE UP (ref 13–17)
INR BLD: 1.21 RATIO — HIGH (ref 0.88–1.16)
MCHC RBC-ENTMCNC: 28.4 PG — SIGNIFICANT CHANGE UP (ref 27–34)
MCHC RBC-ENTMCNC: 33.7 GM/DL — SIGNIFICANT CHANGE UP (ref 32–36)
MCV RBC AUTO: 84.1 FL — SIGNIFICANT CHANGE UP (ref 80–100)
NRBC # BLD: 0 /100 WBCS — SIGNIFICANT CHANGE UP (ref 0–0)
PLATELET # BLD AUTO: 260 K/UL — SIGNIFICANT CHANGE UP (ref 150–400)
POTASSIUM SERPL-MCNC: 4 MMOL/L — SIGNIFICANT CHANGE UP (ref 3.5–5.3)
POTASSIUM SERPL-SCNC: 4 MMOL/L — SIGNIFICANT CHANGE UP (ref 3.5–5.3)
PROT SERPL-MCNC: 6.8 G/DL — SIGNIFICANT CHANGE UP (ref 6–8.3)
PROT SERPL-MCNC: 6.9 G/DL — SIGNIFICANT CHANGE UP (ref 6–8.3)
PROTHROM AB SERPL-ACNC: 14.3 SEC — HIGH (ref 10.6–13.6)
RBC # BLD: 4.9 M/UL — SIGNIFICANT CHANGE UP (ref 4.2–5.8)
RBC # FLD: 12.7 % — SIGNIFICANT CHANGE UP (ref 10.3–14.5)
SARS-COV-2 IGG+IGM SERPL QL IA: 40.1 U/ML — HIGH
SARS-COV-2 IGG+IGM SERPL QL IA: 7.12 INDEX — HIGH
SARS-COV-2 IGG+IGM SERPL QL IA: POSITIVE
SARS-COV-2 IGG+IGM SERPL QL IA: POSITIVE
SODIUM SERPL-SCNC: 138 MMOL/L — SIGNIFICANT CHANGE UP (ref 135–145)
SPECIMEN SOURCE: SIGNIFICANT CHANGE UP
WBC # BLD: 18.38 K/UL — HIGH (ref 3.8–10.5)
WBC # FLD AUTO: 18.38 K/UL — HIGH (ref 3.8–10.5)

## 2021-11-27 PROCEDURE — 71275 CT ANGIOGRAPHY CHEST: CPT | Mod: 26

## 2021-11-27 PROCEDURE — 99233 SBSQ HOSP IP/OBS HIGH 50: CPT

## 2021-11-27 RX ORDER — SODIUM CHLORIDE 9 MG/ML
1000 INJECTION INTRAMUSCULAR; INTRAVENOUS; SUBCUTANEOUS
Refills: 0 | Status: DISCONTINUED | OUTPATIENT
Start: 2021-11-27 | End: 2021-12-01

## 2021-11-27 RX ORDER — ENOXAPARIN SODIUM 100 MG/ML
120 INJECTION SUBCUTANEOUS ONCE
Refills: 0 | Status: COMPLETED | OUTPATIENT
Start: 2021-11-27 | End: 2021-11-27

## 2021-11-27 RX ADMIN — PANTOPRAZOLE SODIUM 40 MILLIGRAM(S): 20 TABLET, DELAYED RELEASE ORAL at 06:27

## 2021-11-27 RX ADMIN — ENOXAPARIN SODIUM 40 MILLIGRAM(S): 100 INJECTION SUBCUTANEOUS at 06:28

## 2021-11-27 RX ADMIN — SODIUM CHLORIDE 75 MILLILITER(S): 9 INJECTION INTRAMUSCULAR; INTRAVENOUS; SUBCUTANEOUS at 22:02

## 2021-11-27 RX ADMIN — ALBUTEROL 2 PUFF(S): 90 AEROSOL, METERED ORAL at 17:41

## 2021-11-27 RX ADMIN — Medication 6 MILLIGRAM(S): at 06:26

## 2021-11-27 RX ADMIN — REMDESIVIR 500 MILLIGRAM(S): 5 INJECTION INTRAVENOUS at 00:17

## 2021-11-27 RX ADMIN — ENOXAPARIN SODIUM 120 MILLIGRAM(S): 100 INJECTION SUBCUTANEOUS at 22:02

## 2021-11-27 RX ADMIN — ENOXAPARIN SODIUM 40 MILLIGRAM(S): 100 INJECTION SUBCUTANEOUS at 17:40

## 2021-11-27 NOTE — PROGRESS NOTE ADULT - ASSESSMENT
A/P:  34 yo male with no significant past medical hx, morbid obesity presented with worsening sob, unvaccinated for covid 19, admitted for severe COVID pneumonia.  today with increase in oxygen requirement, tachycardia.    # AHRF 2/2 covid 19 pneumonia  # Severe COVID pneumonia    - will continue on iv dexamethasone 6 mg, positive covid antibodies, will d/c Remdesivir.   - worsening inflammatory markers as compared to prior admission. d dimer 445.  - follow CTA chest to r/o PE, will keep on FD lovenox until PE ruled out.  - follow covid inflammatory markers, crp, LDH, ferritin d dimer.  - c/w contact and airborne isolation.  - monitor and titrate oxygen requirements  - Will keep threshold for ICU consult       none

## 2021-11-27 NOTE — PROGRESS NOTE ADULT - SUBJECTIVE AND OBJECTIVE BOX
HPI:  35 year old man with no significant past medical hx came to ED c/o worsening cough, sob, fever, chills, headache, nausea, vomiting, diarrhea, poor oral intake. Patient is unvaccinated for COVID19, tested positive on . Patient denies palpitations, chest pain, bleeding, hemoptysis, abdominal pain or dizziness.   Of note, patient was admitted on  for COVID19 infection and discharged on  however decided to come back for further management, now hypoxic to 88-89% on ambulation.   (2021 21:44)      Patient is a 35y old  Male who presents with a chief complaint of AHRF 2/2 COVID19 PNA (2021 10:41)      INTERVAL HPI/OVERNIGHT EVENTS:  T(C): 36.8 (21 @ 05:35), Max: 37.1 (21 @ 21:18)  HR: 94 (21 @ 05:35) (94 - 98)  BP: 120/71 (21 @ 05:35) (110/62 - 120/71)  RR: 18 (21 @ 05:35) (18 - 18)  SpO2: 89% (21 @ 08:50) (89% - 95%)  Wt(kg): --  I&O's Summary      REVIEW OF SYSTEMS: denies fever, chills, SOB, palpitations, chest pain, abdominal pain, nausea, vomitting, diarrhea, constipation, dizziness    MEDICATIONS  (STANDING):  dexAMETHasone  Injectable 6 milliGRAM(s) IV Push daily  enoxaparin Injectable 40 milliGRAM(s) SubCutaneous every 12 hours  influenza   Vaccine 0.5 milliLiter(s) IntraMuscular once  pantoprazole    Tablet 40 milliGRAM(s) Oral before breakfast  remdesivir  IVPB   IV Intermittent   remdesivir  IVPB 100 milliGRAM(s) IV Intermittent every 24 hours    MEDICATIONS  (PRN):  acetaminophen     Tablet .. 650 milliGRAM(s) Oral every 6 hours PRN Temp greater or equal to 38C (100.4F), Mild Pain (1 - 3)  ALBUTerol    90 MICROgram(s) HFA Inhaler 2 Puff(s) Inhalation every 6 hours PRN Shortness of Breath and/or Wheezing  guaiFENesin Oral Liquid (Sugar-Free) 200 milliGRAM(s) Oral every 6 hours PRN Cough  ondansetron Injectable 4 milliGRAM(s) IV Push every 8 hours PRN Nausea and/or Vomiting      PHYSICAL EXAM:  GENERAL: NAD, well-groomed, well-developed  HEAD:  Atraumatic, Normocephalic  EYES: EOMI, PERRLA, conjunctiva and sclera clear  ENMT: No tonsillar erythema, exudates, or enlargement; Moist mucous membranes, Good dentition, No lesions  NECK: Supple, No JVD, Normal thyroid  NERVOUS SYSTEM:  Alert & Oriented X3, Good concentration; Motor Strength 5/5 B/L upper and lower extremities; DTRs 2+ intact and symmetric  CHEST/LUNG: Clear to percussion bilaterally; No rales, rhonchi, wheezing, or rubs  HEART: Regular rate and rhythm; No murmurs, rubs, or gallops  ABDOMEN: Soft, Nontender, Nondistended; Bowel sounds present  EXTREMITIES:  2+ Peripheral Pulses, No clubbing, cyanosis, or edema  LYMPH: No lymphadenopathy noted  SKIN: No rashes or lesions  LABS:                        13.9   18.38 )-----------( 260      ( 2021 05:53 )             41.2     11-    138  |  103  |  14  ----------------------------<  132<H>  4.0   |  28  |  0.83    Ca    8.9      2021 05:53  Phos  4.0     11-  Mg     2.6     11-    TPro  6.9  /  Alb  2.3<L>  /  TBili  0.5  /  DBili  <0.1  /  AST  10  /  ALT  34  /  AlkPhos  49  11-27    PT/INR - ( 2021 05:53 )   PT: 14.3 sec;   INR: 1.21 ratio         PTT - ( 2021 20:34 )  PTT:29.4 sec  Urinalysis Basic - ( 2021 02:04 )    Color: Yellow / Appearance: Clear / S.005 / pH: x  Gluc: x / Ketone: Negative  / Bili: Negative / Urobili: Negative   Blood: x / Protein: 100 / Nitrite: Negative   Leuk Esterase: Negative / RBC: 2-5 /HPF / WBC 0-2 /HPF   Sq Epi: x / Non Sq Epi: Few /HPF / Bacteria: Moderate /HPF      CAPILLARY BLOOD GLUCOSE            Urinalysis Basic - ( 2021 02:04 )    Color: Yellow / Appearance: Clear / S.005 / pH: x  Gluc: x / Ketone: Negative  / Bili: Negative / Urobili: Negative   Blood: x / Protein: 100 / Nitrite: Negative   Leuk Esterase: Negative / RBC: 2-5 /HPF / WBC 0-2 /HPF   Sq Epi: x / Non Sq Epi: Few /HPF / Bacteria: Moderate /HPF     HPI:  35 year old man with no significant past medical hx came to ED c/o worsening cough, sob, fever, chills, headache, nausea, vomiting, diarrhea, poor oral intake. Patient is unvaccinated for COVID19, tested positive on . Patient denies palpitations, chest pain, bleeding, hemoptysis, abdominal pain or dizziness.   Of note, patient was admitted on  for COVID19 infection and discharged on  however decided to come back for further management, now hypoxic to 88-89% on ambulation.   (2021 21:44)      Patient is a 35y old  Male who presents with a chief complaint of AHRF 2/2 COVID19 PNA (2021 10:41)      INTERVAL HPI/OVERNIGHT EVENTS: Patient seen and examined at bedside.    T(C): 36.8 (21 @ 05:35), Max: 37.1 (21 @ 21:18)  HR: 94 (21 @ 05:35) (94 - 98)  BP: 120/71 (21 @ 05:35) (110/62 - 120/71)  RR: 18 (21 @ 05:35) (18 - 18)  SpO2: 89% (21 @ 08:50) (89% - 95%)  Wt(kg): --  I&O's Summary      REVIEW OF SYSTEMS: denies fever, chills, SOB, palpitations, chest pain, abdominal pain, nausea, vomitting, diarrhea, constipation, dizziness    MEDICATIONS  (STANDING):  dexAMETHasone  Injectable 6 milliGRAM(s) IV Push daily  enoxaparin Injectable 40 milliGRAM(s) SubCutaneous every 12 hours  influenza   Vaccine 0.5 milliLiter(s) IntraMuscular once  pantoprazole    Tablet 40 milliGRAM(s) Oral before breakfast  remdesivir  IVPB   IV Intermittent   remdesivir  IVPB 100 milliGRAM(s) IV Intermittent every 24 hours    MEDICATIONS  (PRN):  acetaminophen     Tablet .. 650 milliGRAM(s) Oral every 6 hours PRN Temp greater or equal to 38C (100.4F), Mild Pain (1 - 3)  ALBUTerol    90 MICROgram(s) HFA Inhaler 2 Puff(s) Inhalation every 6 hours PRN Shortness of Breath and/or Wheezing  guaiFENesin Oral Liquid (Sugar-Free) 200 milliGRAM(s) Oral every 6 hours PRN Cough  ondansetron Injectable 4 milliGRAM(s) IV Push every 8 hours PRN Nausea and/or Vomiting      PHYSICAL EXAM:  GENERAL: NAD, well-groomed, well-developed  HEAD:  Atraumatic, Normocephalic  EYES: EOMI, PERRLA, conjunctiva and sclera clear  ENMT: No tonsillar erythema, exudates, or enlargement; Moist mucous membranes, Good dentition, No lesions  NECK: Supple, No JVD, Normal thyroid  NERVOUS SYSTEM:  Alert & Oriented X3, Good concentration; Motor Strength 5/5 B/L upper and lower extremities; DTRs 2+ intact and symmetric  CHEST/LUNG: Clear to percussion bilaterally; No rales, rhonchi, wheezing, or rubs  HEART: Regular rate and rhythm; No murmurs, rubs, or gallops  ABDOMEN: Soft, Nontender, Nondistended; Bowel sounds present  EXTREMITIES:  2+ Peripheral Pulses, No clubbing, cyanosis, or edema  LYMPH: No lymphadenopathy noted  SKIN: No rashes or lesions  LABS:                        13.9   18.38 )-----------( 260      ( 2021 05:53 )             41.2     11-    138  |  103  |  14  ----------------------------<  132<H>  4.0   |  28  |  0.83    Ca    8.9      2021 05:53  Phos  4.0     11-  Mg     2.6     -    TPro  6.9  /  Alb  2.3<L>  /  TBili  0.5  /  DBili  <0.1  /  AST  10  /  ALT  34  /  AlkPhos  49  11-27    PT/INR - ( 2021 05:53 )   PT: 14.3 sec;   INR: 1.21 ratio         PTT - ( 2021 20:34 )  PTT:29.4 sec  Urinalysis Basic - ( 2021 02:04 )    Color: Yellow / Appearance: Clear / S.005 / pH: x  Gluc: x / Ketone: Negative  / Bili: Negative / Urobili: Negative   Blood: x / Protein: 100 / Nitrite: Negative   Leuk Esterase: Negative / RBC: 2-5 /HPF / WBC 0-2 /HPF   Sq Epi: x / Non Sq Epi: Few /HPF / Bacteria: Moderate /HPF      CAPILLARY BLOOD GLUCOSE            Urinalysis Basic - ( 2021 02:04 )    Color: Yellow / Appearance: Clear / S.005 / pH: x  Gluc: x / Ketone: Negative  / Bili: Negative / Urobili: Negative   Blood: x / Protein: 100 / Nitrite: Negative   Leuk Esterase: Negative / RBC: 2-5 /HPF / WBC 0-2 /HPF   Sq Epi: x / Non Sq Epi: Few /HPF / Bacteria: Moderate /HPF     HPI:  35 year old man with no significant past medical hx came to ED c/o worsening cough, sob, fever, chills, headache, nausea, vomiting, diarrhea, poor oral intake. Patient is unvaccinated for COVID19, tested positive on . Patient denies palpitations, chest pain, bleeding, hemoptysis, abdominal pain or dizziness.   Of note, patient was admitted on  for COVID19 infection and discharged on  however decided to come back for further management, now hypoxic to 88-89% on ambulation.   (2021 21:44)      Patient is a 35y old  Male who presents with a chief complaint of AHRF 2/2 COVID19 PNA (2021 10:41)      INTERVAL HPI/OVERNIGHT EVENTS: Patient seen and examined at bedside.   MIld respiratory distress saturating 90-91% on 5 Litre NC, mild- mod increase in oxygen requirement.      T(C): 36.8 (21 @ 05:35), Max: 37.1 (21 @ 21:18)  HR: 94 (21 @ 05:35) (94 - 98)  BP: 120/71 (21 @ 05:35) (110/62 - 120/71)  RR: 18 (21 @ 05:35) (18 - 18)  SpO2: 89% (21 @ 08:50) (89% - 95%)  Wt(kg): --  I&O's Summary      REVIEW OF SYSTEMS: denies fever, chills, SOB, palpitations, chest pain, abdominal pain, nausea, vomitting, diarrhea, constipation, dizziness    MEDICATIONS  (STANDING):  dexAMETHasone  Injectable 6 milliGRAM(s) IV Push daily  enoxaparin Injectable 40 milliGRAM(s) SubCutaneous every 12 hours  influenza   Vaccine 0.5 milliLiter(s) IntraMuscular once  pantoprazole    Tablet 40 milliGRAM(s) Oral before breakfast  remdesivir  IVPB   IV Intermittent   remdesivir  IVPB 100 milliGRAM(s) IV Intermittent every 24 hours    MEDICATIONS  (PRN):  acetaminophen     Tablet .. 650 milliGRAM(s) Oral every 6 hours PRN Temp greater or equal to 38C (100.4F), Mild Pain (1 - 3)  ALBUTerol    90 MICROgram(s) HFA Inhaler 2 Puff(s) Inhalation every 6 hours PRN Shortness of Breath and/or Wheezing  guaiFENesin Oral Liquid (Sugar-Free) 200 milliGRAM(s) Oral every 6 hours PRN Cough  ondansetron Injectable 4 milliGRAM(s) IV Push every 8 hours PRN Nausea and/or Vomiting      PHYSICAL EXAM:  GENERAL: mild respiratory distress at rest, well-groomed, well-developed  HEAD:  Atraumatic, Normocephalic  EYES: EOMI, PERRLA, conjunctiva and sclera clear  ENMT: No tonsillar erythema, exudates, or enlargement; Moist mucous membranes  NECK: Supple, No JVD, Normal thyroid  NERVOUS SYSTEM:  Alert & Oriented X3, Good concentration; no focal defict  CHEST/LUNG: Clear to percussion bilaterally; No rales, rhonchi, wheezing, or rubs  HEART: Regular rate and rhythm; No murmurs, rubs, or gallops  ABDOMEN: Soft, Nontender, Nondistended; Bowel sounds present  EXTREMITIES:  2+ Peripheral Pulses, No clubbing, cyanosis, or edema  LYMPH: No lymphadenopathy noted  SKIN: No rashes or lesions    LABS:                        13.9   18.38 )-----------( 260      ( 2021 05:53 )             41.2     11-    138  |  103  |  14  ----------------------------<  132<H>  4.0   |  28  |  0.83    Ca    8.9      2021 05:53  Phos  4.0     11-  Mg     2.6     -    TPro  6.9  /  Alb  2.3<L>  /  TBili  0.5  /  DBili  <0.1  /  AST  10  /  ALT  34  /  AlkPhos  49  11-27    PT/INR - ( 2021 05:53 )   PT: 14.3 sec;   INR: 1.21 ratio         PTT - ( 2021 20:34 )  PTT:29.4 sec  Urinalysis Basic - ( 2021 02:04 )    Color: Yellow / Appearance: Clear / S.005 / pH: x  Gluc: x / Ketone: Negative  / Bili: Negative / Urobili: Negative   Blood: x / Protein: 100 / Nitrite: Negative   Leuk Esterase: Negative / RBC: 2-5 /HPF / WBC 0-2 /HPF   Sq Epi: x / Non Sq Epi: Few /HPF / Bacteria: Moderate /HPF      CAPILLARY BLOOD GLUCOSE      Urinalysis Basic - ( 2021 02:04 )    Color: Yellow / Appearance: Clear / S.005 / pH: x  Gluc: x / Ketone: Negative  / Bili: Negative / Urobili: Negative   Blood: x / Protein: 100 / Nitrite: Negative   Leuk Esterase: Negative / RBC: 2-5 /HPF / WBC 0-2 /HPF   Sq Epi: x / Non Sq Epi: Few /HPF / Bacteria: Moderate /HPF    procal 0.07  d dimer 445>271    CXR:   Findings of bilateral lung opacities are suspicious for pneumonia.

## 2021-11-28 LAB
ALBUMIN SERPL ELPH-MCNC: 2.4 G/DL — LOW (ref 3.5–5)
ALBUMIN SERPL ELPH-MCNC: 2.4 G/DL — LOW (ref 3.5–5)
ALP SERPL-CCNC: 49 U/L — SIGNIFICANT CHANGE UP (ref 40–120)
ALP SERPL-CCNC: 50 U/L — SIGNIFICANT CHANGE UP (ref 40–120)
ALT FLD-CCNC: 35 U/L DA — SIGNIFICANT CHANGE UP (ref 10–60)
ALT FLD-CCNC: 36 U/L DA — SIGNIFICANT CHANGE UP (ref 10–60)
ANION GAP SERPL CALC-SCNC: 7 MMOL/L — SIGNIFICANT CHANGE UP (ref 5–17)
AST SERPL-CCNC: 9 U/L — LOW (ref 10–40)
AST SERPL-CCNC: 9 U/L — LOW (ref 10–40)
BILIRUB DIRECT SERPL-MCNC: 0.1 MG/DL — SIGNIFICANT CHANGE UP (ref 0–0.3)
BILIRUB INDIRECT FLD-MCNC: 0.3 MG/DL — SIGNIFICANT CHANGE UP (ref 0.2–1)
BILIRUB SERPL-MCNC: 0.4 MG/DL — SIGNIFICANT CHANGE UP (ref 0.2–1.2)
BILIRUB SERPL-MCNC: 0.4 MG/DL — SIGNIFICANT CHANGE UP (ref 0.2–1.2)
BUN SERPL-MCNC: 16 MG/DL — SIGNIFICANT CHANGE UP (ref 7–18)
CALCIUM SERPL-MCNC: 9 MG/DL — SIGNIFICANT CHANGE UP (ref 8.4–10.5)
CHLORIDE SERPL-SCNC: 101 MMOL/L — SIGNIFICANT CHANGE UP (ref 96–108)
CO2 SERPL-SCNC: 30 MMOL/L — SIGNIFICANT CHANGE UP (ref 22–31)
CREAT SERPL-MCNC: 0.94 MG/DL — SIGNIFICANT CHANGE UP (ref 0.5–1.3)
CRP SERPL-MCNC: 55 MG/L — HIGH
D DIMER BLD IA.RAPID-MCNC: 246 NG/ML DDU — HIGH
ERYTHROCYTE [SEDIMENTATION RATE] IN BLOOD: 39 MM/HR — HIGH (ref 0–15)
FERRITIN SERPL-MCNC: 851 NG/ML — HIGH (ref 30–400)
GLUCOSE SERPL-MCNC: 145 MG/DL — HIGH (ref 70–99)
HCT VFR BLD CALC: 41.8 % — SIGNIFICANT CHANGE UP (ref 39–50)
HGB BLD-MCNC: 14 G/DL — SIGNIFICANT CHANGE UP (ref 13–17)
INR BLD: 1.22 RATIO — HIGH (ref 0.88–1.16)
LDH SERPL L TO P-CCNC: 403 U/L — HIGH (ref 120–225)
MCHC RBC-ENTMCNC: 28.5 PG — SIGNIFICANT CHANGE UP (ref 27–34)
MCHC RBC-ENTMCNC: 33.5 GM/DL — SIGNIFICANT CHANGE UP (ref 32–36)
MCV RBC AUTO: 85 FL — SIGNIFICANT CHANGE UP (ref 80–100)
NRBC # BLD: 0 /100 WBCS — SIGNIFICANT CHANGE UP (ref 0–0)
PLATELET # BLD AUTO: 328 K/UL — SIGNIFICANT CHANGE UP (ref 150–400)
POTASSIUM SERPL-MCNC: 4 MMOL/L — SIGNIFICANT CHANGE UP (ref 3.5–5.3)
POTASSIUM SERPL-SCNC: 4 MMOL/L — SIGNIFICANT CHANGE UP (ref 3.5–5.3)
PROCALCITONIN SERPL-MCNC: 0.06 NG/ML — SIGNIFICANT CHANGE UP (ref 0.02–0.1)
PROT SERPL-MCNC: 7 G/DL — SIGNIFICANT CHANGE UP (ref 6–8.3)
PROT SERPL-MCNC: 7.1 G/DL — SIGNIFICANT CHANGE UP (ref 6–8.3)
PROTHROM AB SERPL-ACNC: 14.4 SEC — HIGH (ref 10.6–13.6)
RBC # BLD: 4.92 M/UL — SIGNIFICANT CHANGE UP (ref 4.2–5.8)
RBC # FLD: 12.8 % — SIGNIFICANT CHANGE UP (ref 10.3–14.5)
SODIUM SERPL-SCNC: 138 MMOL/L — SIGNIFICANT CHANGE UP (ref 135–145)
WBC # BLD: 14.32 K/UL — HIGH (ref 3.8–10.5)
WBC # FLD AUTO: 14.32 K/UL — HIGH (ref 3.8–10.5)

## 2021-11-28 PROCEDURE — 99233 SBSQ HOSP IP/OBS HIGH 50: CPT | Mod: GC

## 2021-11-28 RX ORDER — REMDESIVIR 5 MG/ML
200 INJECTION INTRAVENOUS EVERY 24 HOURS
Refills: 0 | Status: COMPLETED | OUTPATIENT
Start: 2021-11-28 | End: 2021-11-28

## 2021-11-28 RX ORDER — ENOXAPARIN SODIUM 100 MG/ML
40 INJECTION SUBCUTANEOUS EVERY 12 HOURS
Refills: 0 | Status: DISCONTINUED | OUTPATIENT
Start: 2021-11-28 | End: 2021-12-03

## 2021-11-28 RX ORDER — CHLORHEXIDINE GLUCONATE 213 G/1000ML
1 SOLUTION TOPICAL
Refills: 0 | Status: DISCONTINUED | OUTPATIENT
Start: 2021-11-28 | End: 2021-12-03

## 2021-11-28 RX ORDER — REMDESIVIR 5 MG/ML
INJECTION INTRAVENOUS
Refills: 0 | Status: COMPLETED | OUTPATIENT
Start: 2021-11-28 | End: 2021-12-02

## 2021-11-28 RX ORDER — REMDESIVIR 5 MG/ML
100 INJECTION INTRAVENOUS EVERY 24 HOURS
Refills: 0 | Status: COMPLETED | OUTPATIENT
Start: 2021-11-29 | End: 2021-12-02

## 2021-11-28 RX ADMIN — PANTOPRAZOLE SODIUM 40 MILLIGRAM(S): 20 TABLET, DELAYED RELEASE ORAL at 06:09

## 2021-11-28 RX ADMIN — SODIUM CHLORIDE 75 MILLILITER(S): 9 INJECTION INTRAMUSCULAR; INTRAVENOUS; SUBCUTANEOUS at 13:30

## 2021-11-28 RX ADMIN — REMDESIVIR 500 MILLIGRAM(S): 5 INJECTION INTRAVENOUS at 14:47

## 2021-11-28 RX ADMIN — SODIUM CHLORIDE 75 MILLILITER(S): 9 INJECTION INTRAMUSCULAR; INTRAVENOUS; SUBCUTANEOUS at 06:09

## 2021-11-28 RX ADMIN — Medication 6 MILLIGRAM(S): at 06:09

## 2021-11-28 RX ADMIN — ENOXAPARIN SODIUM 40 MILLIGRAM(S): 100 INJECTION SUBCUTANEOUS at 12:09

## 2021-11-28 RX ADMIN — ENOXAPARIN SODIUM 40 MILLIGRAM(S): 100 INJECTION SUBCUTANEOUS at 17:02

## 2021-11-28 NOTE — CONSULT NOTE ADULT - ASSESSMENT
ASSESSMENT:  35 year old man with no significant past medical hx came to ED c/o worsening cough, sob, fever, chills, headache, nausea, vomiting, diarrhea, poor oral intake. Patient is unvaccinated for COVID19, tested positive on 11/23. Initially patient's oxygen requirement was low but has been increasing since admission. He was on 6L NC but desaturating into 80s and tachypneic Patient now on NRB 10L saturating 94-95% and is tachypneic in the 30s. Pt will benefit from HFNC, hence patient will be transferred to ICU for further management.      #Acute Hypoxic Respiratory Failure  #COVID-19 Pneumonia    PLAN:  #NEURO  Pt is AAOx3  No active issues    #PULMONARY  *Acute hypoxemic respiratory failure due to COVID-19 Pneumonia.   P/w cough, sob, fever, leucocytosis, tachycardia   - CXR showed worsening bilateral patchy opacities  - Worsening hypoxia and tachypneic requiring HFNC  - COVID19 PCR +ve on 11/23  - D dimer mild elevated  271>>246  - C/w Tylenol, Albuterol Inhaler  - c/w with Zofran and Robitussin PRN  - C/w Decadron 6 mg IV daily for 10 days  - started on Remdesivir  - Isolation precautions  - Blood Cx NGTD   - CT angio negative for PE, c/w prophylactic lovenox for AC  - f/u daily biomarkers.    #CARDIOVASCULAR  - HR and BPs controlled  - EKG showed NSR  - D-dimer 271>>246  - CTA chest negative for PE    #GASTROINTESTINAL  - On protonix for stress ulcer prophylaxis    #RENAL  - BU/Creat 16/0.94  - No active issues    #INFECTIOUS DISEASE  *COVID PNA  - management same as above    #ENDOCRINE  - Blood sugars WNL  - Monitor BG levels considering pt is on decadron    #HEME  *Leucocytosis  - Whit count 18.38>>14.32  - Due to COVID-19 PNA infection    #PROPHYLAXIS  - DVT ppx with lovenox sq  - GI ppx with Protonix    DISPO:  Transfer to ICU  CODE STATUS:  FULL CODE

## 2021-11-28 NOTE — CONSULT NOTE ADULT - ATTENDING COMMENTS
IMP: This is a 35 year old man,  of [place] , non smoker  with no significant past medical hx came to ED c/o worsening cough, sob, fever, chills, headache, nausea, vomiting, diarrhea, poor oral intake. Patient is unvaccinated for COVID19, tested positive on 11/23. Initially patient's oxygen requirement was low but has been increasing since admission. He was on 6L NC but desaturating into 80s and tachypneic Patient now on NRB 10L saturating 94-95% and is tachypneic in the 30s. Pt will benefit from HFNC, hence patient will be transferred to ICU for further management.    -  ASSESSMENT    - Acute Hypoxic Resp Failure  - PNA b/l   - Covid-19 infection   - Elevated Blood Sugar   - Obesity       Plan     - Transfer to ICU   - O2 by HFNC or BiPaP as needed to maintain sat>90  - Isolation ; contact and airborne   - Encourage non supine position as tolerated   - Remdesivir x 10 days   - Decadron 6 mg /day x 10 days   - Monitor Biomarkers daily   - Hemodynamic monitoring   - Diet as tolerated   - Monitor I/O  - Monitor blood sugar with coverage   - A1c  - DVT / GI prophy     discussed with Dr Trammell and medical resident

## 2021-11-28 NOTE — CHART NOTE - NSCHARTNOTEFT_GEN_A_CORE
Patient is COVID+ and has been having episodes of shortness of breath. He is desaturating to 88%. Currently he is on O2 supplement via nasal cannula at 6LPM. He had been titrated up from 3LPM. We ordered non-rebreather mask on him at the same O2 settings. Patient has no complains and mental status is normal. We also asked the nurse to keep head elevated at 45%. We will continue to monitor his oxygen status. Primary team will follow-up in the morning.

## 2021-11-28 NOTE — PROGRESS NOTE ADULT - PROBLEM SELECTOR PLAN 1
- P/w cough, sob, fever, leucocytosis, tachycardia   - CXR showed worsening bilateral patchy opacities  - Saturating well on 3L /NC, pt desaturated to 88% on RA and ambulatory  - COVID19 PCR +ve on 11/23  - D dimer mild elevated  - C/w Tylenol, Albuterol Inhaler, Robitussin PRN  - C/w Decadron 6 mg IV daily for 10 days  - Isolation precautions  - C/w isolation precaution  -Blood Cx NGTD   -CT angio negative for PE, c/w prophylactic AC   -Patient requiring more oxygen now, currently on 10L NRB saturating 94-95%. Low threshold for ICU consult.  -f/u daily inf markers.
- P/w cough, sob, fever, leucocytosis, tachycardia   - CXR showed worsening bilateral patchy opacities  - Saturating well on 3L /NC, pt desaturated to 88% on RA and ambulatory  - COVID19 PCR +ve on 11/23  - D dimer mild elevated  - C/w Tylenol, Albuterol Inhaler, Robitussin PRN  - C/w Decadron 6 mg IV daily for 10 days and Remdesivir   - Isolation precautions  - F/u AB and   - C/w isolation precaution  - F/u blood cultures

## 2021-11-28 NOTE — PROGRESS NOTE ADULT - SUBJECTIVE AND OBJECTIVE BOX
PGY-1 Progress Note discussed with attending    PAGER #: [[phone]] TILL 5:00 PM  PLEASE CONTACT ON CALL TEAM:  - On Call Team (Please refer to Luciano) FROM 5:00 PM - 8:30PM  - Nightfloat Team FROM 8:30 -7:30 AM    INTERVAL HPI/ OVERNIGHT EVENTS: Patient evaluated at beside this AM, oxygen requirement went up from last night. Patient was on 6L NC but desaturating into 80s and c/o SOB. Patient now on NRB 10L saturating 94-95%.C/o mild cough. Denies any chest pain. No other complaints at the moment.     REVIEW OF SYSTEMS:  CONSTITUTIONAL: No fever, weight loss, or fatigue  RESPIRATORY: No cough, wheezing, chills or hemoptysis; No shortness of breath  CARDIOVASCULAR: No chest pain, palpitations, dizziness, or leg swelling  GASTROINTESTINAL: No abdominal pain. No nausea, vomiting, or hematemesis; No diarrhea or constipation. No melena or hematochezia.  GENITOURINARY: No dysuria or hematuria, urinary frequency  NEUROLOGICAL: No headaches, memory loss, loss of strength, numbness, or tremors  SKIN: No itching, burning, rashes, or lesions     MEDICATIONS  (STANDING):  dexAMETHasone  Injectable 6 milliGRAM(s) IV Push daily  enoxaparin Injectable 40 milliGRAM(s) SubCutaneous every 12 hours  influenza   Vaccine 0.5 milliLiter(s) IntraMuscular once  pantoprazole    Tablet 40 milliGRAM(s) Oral before breakfast  sodium chloride 0.9%. 1000 milliLiter(s) (75 mL/Hr) IV Continuous <Continuous>    MEDICATIONS  (PRN):  acetaminophen     Tablet .. 650 milliGRAM(s) Oral every 6 hours PRN Temp greater or equal to 38C (100.4F), Mild Pain (1 - 3)  ALBUTerol    90 MICROgram(s) HFA Inhaler 2 Puff(s) Inhalation every 6 hours PRN Shortness of Breath and/or Wheezing  guaiFENesin Oral Liquid (Sugar-Free) 200 milliGRAM(s) Oral every 6 hours PRN Cough  ondansetron Injectable 4 milliGRAM(s) IV Push every 8 hours PRN Nausea and/or Vomiting      Vital Signs Last 24 Hrs  T(C): 37.1 (28 Nov 2021 05:13), Max: 37.5 (27 Nov 2021 14:24)  T(F): 98.7 (28 Nov 2021 05:13), Max: 99.5 (27 Nov 2021 14:24)  HR: 105 (28 Nov 2021 05:13) (105 - 106)  BP: 125/70 (28 Nov 2021 05:13) (103/64 - 125/70)  BP(mean): --  RR: 20 (28 Nov 2021 08:35) (18 - 20)  SpO2: 90% (28 Nov 2021 08:35) (88% - 92%)    PHYSICAL EXAMINATION:  GENERAL: NAD, AAOx3  HEAD: AT/NC  EYES: conjunctiva and sclera clear  NECK: supple, No JVD noted, Normal thyroid  CHEST/LUNG: CTABL; no rales, rhonchi, wheezing, or rubs  HEART: regular rate and rhythm; no murmurs, rubs, or gallops  ABDOMEN: soft, nontender, nondistended; Bowel sounds present  EXTREMITIES:  2+ Peripheral Pulses, No clubbing, cyanosis, or edema  SKIN: warm dry                          14.0   14.32 )-----------( 328      ( 28 Nov 2021 05:42 )             41.8     11-28    138  |  101  |  16  ----------------------------<  145<H>  4.0   |  30  |  0.94    Ca    9.0      28 Nov 2021 05:42    TPro  7.1  /  Alb  2.4<L>  /  TBili  0.4  /  DBili  0.1  /  AST  9<L>  /  ALT  36  /  AlkPhos  49  11-28    LIVER FUNCTIONS - ( 28 Nov 2021 05:42 )  Alb: 2.4 g/dL / Pro: 7.1 g/dL / ALK PHOS: 49 U/L / ALT: 36 U/L DA / AST: 9 U/L / GGT: x               PT/INR - ( 28 Nov 2021 05:42 )   PT: 14.4 sec;   INR: 1.22 ratio           COVID-19 PCR: Detected (25 Nov 2021 20:35)  SARS-CoV-2: Detected (23 Nov 2021 19:35)      CAPILLARY BLOOD GLUCOSE          RADIOLOGY & ADDITIONAL TESTS:

## 2021-11-28 NOTE — CONSULT NOTE ADULT - SUBJECTIVE AND OBJECTIVE BOX
HPI:  35 year old man with no significant past medical hx came to ED c/o worsening cough, sob, fever, chills, headache, nausea, vomiting, diarrhea, poor oral intake. Patient is unvaccinated for COVID19, tested positive on 11/23. Patient denies palpitations, chest pain, bleeding, hemoptysis, abdominal pain or dizziness. Patient was admitted on 11/23 for COVID19 infection and discharged on 11/24 however decided to come back for further management, now hypoxic to 88-89% on ambulation.    Brief Hospital Course:   Pt was admitted to the medicine floor. He was started on decadron, albuterol, robitussin and prophylactic zofran and protonix.  Patient's oxygen requirement has been increasing since admission. He was on 6L NC but desaturating into 80s and c/o SOB. Patient now on NRB 10L saturating 94-95% and is tachypneic in the 30s. Pt will benefit from HFNC, hence patient will be transferred to ICU.     PAST MEDICAL & SURGICAL HISTORY:  No pertinent past medical history    No significant past surgical history    FAMILY HISTORY: No significance    ROS:  See HPI     Allergies  No Known Allergies    PHYSICAL EXAM:  GENERAL: NAD, AAOx3  HEAD: AT/NC  EYES: conjunctiva and sclera clear  NECK: supple, No JVD noted, Normal thyroid  CHEST/LUNG: CTABL; no rales, rhonchi, wheezing, or rubs  HEART: regular rate and rhythm; no murmurs, rubs, or gallops  ABDOMEN: soft, nontender, nondistended; Bowel sounds present  EXTREMITIES:  2+ Peripheral Pulses, No clubbing, cyanosis, or edema  SKIN: warm dry    ICU Vital Signs Last 24 Hrs  T(C): 37.1 (28 Nov 2021 05:13), Max: 37.5 (27 Nov 2021 14:24)  T(F): 98.7 (28 Nov 2021 05:13), Max: 99.5 (27 Nov 2021 14:24)  HR: 105 (28 Nov 2021 05:13) (105 - 106)  BP: 125/70 (28 Nov 2021 05:13) (103/64 - 125/70)  RR: 20 (28 Nov 2021 08:35) (18 - 20)  SpO2: 90% (28 Nov 2021 08:35) (88% - 92%)      General: Not in distress  HEENT:  LEONEL              Lymphatic system: No LN  Lungs: Bilateral BS  Cardiovascular: Regular  Gastrointestinal: Soft, Positive BS  Musculoskeletal: No clubbing.  Moves all extremities.    Skin: Warm.  Intact  Neurological: No motor or sensory deficit         LABS:                          14.0   14.32 )-----------( 328      ( 28 Nov 2021 05:42 )             41.8                                               11-28    138  |  101  |  16  ----------------------------<  145<H>  4.0   |  30  |  0.94    Ca    9.0      28 Nov 2021 05:42    TPro  7.1  /  Alb  2.4<L>  /  TBili  0.4  /  DBili  0.1  /  AST  9<L>  /  ALT  36  /  AlkPhos  49  11-28      PT/INR - ( 28 Nov 2021 05:42 )   PT: 14.4 sec;   INR: 1.22 ratio                    LIVER FUNCTIONS - ( 28 Nov 2021 05:42 )  Alb: 2.4 g/dL / Pro: 7.1 g/dL / ALK PHOS: 49 U/L / ALT: 36 U/L DA / AST: 9 U/L / GGT: x           Culture - Blood (collected 26 Nov 2021 08:37)  Source: .Blood Blood-Peripheral  Preliminary Report (27 Nov 2021 09:01):    No growth to date.    Culture - Blood (collected 26 Nov 2021 08:37)  Source: .Blood Blood-Peripheral  Preliminary Report (27 Nov 2021 09:01):    No growth to date.    Culture - Urine (collected 26 Nov 2021 08:24)  Source: Clean Catch Clean Catch (Midstream)  Final Report (27 Nov 2021 07:14):    No growth                             CXR: Bilateral lung opacities  CTA Chest: Diffuse multifocal pneumonia (greater than 50%). Consideration includes Covid-19.    ECHO:    MEDICATIONS  (STANDING):  dexAMETHasone  Injectable 6 milliGRAM(s) IV Push daily  enoxaparin Injectable 40 milliGRAM(s) SubCutaneous every 12 hours  influenza   Vaccine 0.5 milliLiter(s) IntraMuscular once  pantoprazole    Tablet 40 milliGRAM(s) Oral before breakfast  remdesivir  IVPB   IV Intermittent   remdesivir  IVPB 200 milliGRAM(s) IV Intermittent every 24 hours  sodium chloride 0.9%. 1000 milliLiter(s) (75 mL/Hr) IV Continuous <Continuous>    MEDICATIONS  (PRN):  acetaminophen     Tablet .. 650 milliGRAM(s) Oral every 6 hours PRN Temp greater or equal to 38C (100.4F), Mild Pain (1 - 3)  ALBUTerol    90 MICROgram(s) HFA Inhaler 2 Puff(s) Inhalation every 6 hours PRN Shortness of Breath and/or Wheezing  guaiFENesin Oral Liquid (Sugar-Free) 200 milliGRAM(s) Oral every 6 hours PRN Cough  ondansetron Injectable 4 milliGRAM(s) IV Push every 8 hours PRN Nausea and/or Vomiting    ASSESSMENT AND PLAN:      #NEURO    #PULMONARY  Acute hypoxemic respiratory failure due to COVID-19 Pneumonia.   P/w cough, sob, fever, leucocytosis, tachycardia   - CXR showed worsening bilateral patchy opacities  - Saturating well on 3L /NC, pt desaturated to 88% on RA and ambulatory  - COVID19 PCR +ve on 11/23  - D dimer mild elevated  - C/w Tylenol, Albuterol Inhaler, Robitussin PRN  - C/w Decadron 6 mg IV daily for 10 days  - Isolation precautions  - C/w isolation precaution  -Blood Cx NGTD   -CT angio negative for PE, c/w prophylactic AC   -Patient requiring more oxygen now, currently on 10L NRB saturating 94-95%. Low threshold for ICU consult.  -f/u daily inf markers.    #CARDIOVASCULAR    #GASTROINTESTINAL    #RENAL    #INFECTIOUS DISEASE    #ENDOCRINE    #HEME    #  PROPHYLAXIS  -DVT  -GI    DISPO  CODE STATUS HPI:  35 year old man with no significant past medical hx came to ED c/o worsening cough, sob, fever, chills, headache, nausea, vomiting, diarrhea, poor oral intake. Patient is unvaccinated for COVID19, tested positive on 11/23. Patient denies palpitations, chest pain, bleeding, hemoptysis, abdominal pain or dizziness. Patient was admitted on 11/23 for COVID19 infection and discharged on 11/24 however decided to come back for further management, now hypoxic to 88-89% on ambulation.    Brief Hospital Course:   Pt was admitted to the medicine floor. He was started on decadron, albuterol, robitussin and prophylactic zofran and protonix. Patient's oxygen requirement has been increasing since admission. He was on 6L NC but desaturating into 80s and c/o SOB. Patient now on NRB 10L saturating 94-95% and is tachypneic in the 30s. Pt will benefit from HFNC, hence patient will be transferred to ICU. Sending repeat biomarkers.     PAST MEDICAL & SURGICAL HISTORY:  No pertinent past medical history    No significant past surgical history    FAMILY HISTORY: No significance    ROS:  See HPI     Allergies  No Known Allergies    PHYSICAL EXAM:  GENERAL: NAD, AAOx3  HEAD: AT/NC  EYES: conjunctiva and sclera clear  NECK: supple, No JVD noted, Normal thyroid  CHEST/LUNG: CTABL; no rales, rhonchi, wheezing, or rubs  HEART: regular rate and rhythm; no murmurs, rubs, or gallops  ABDOMEN: soft, nontender, nondistended; Bowel sounds present  EXTREMITIES:  2+ Peripheral Pulses, No clubbing, cyanosis, or edema  SKIN: warm dry    ICU Vital Signs Last 24 Hrs  T(C): 37.1 (28 Nov 2021 05:13), Max: 37.5 (27 Nov 2021 14:24)  T(F): 98.7 (28 Nov 2021 05:13), Max: 99.5 (27 Nov 2021 14:24)  HR: 105 (28 Nov 2021 05:13) (105 - 106)  BP: 125/70 (28 Nov 2021 05:13) (103/64 - 125/70)  RR: 20 (28 Nov 2021 08:35) (18 - 20)  SpO2: 90% (28 Nov 2021 08:35) (88% - 92%)      General: Not in distress  HEENT:  LEONEL              Lymphatic system: No LN  Lungs: Bilateral BS  Cardiovascular: Regular  Gastrointestinal: Soft, Positive BS  Musculoskeletal: No clubbing.  Moves all extremities.    Skin: Warm.  Intact  Neurological: No motor or sensory deficit         LABS:                          14.0   14.32 )-----------( 328      ( 28 Nov 2021 05:42 )             41.8                                               11-28    138  |  101  |  16  ----------------------------<  145<H>  4.0   |  30  |  0.94    Ca    9.0      28 Nov 2021 05:42    TPro  7.1  /  Alb  2.4<L>  /  TBili  0.4  /  DBili  0.1  /  AST  9<L>  /  ALT  36  /  AlkPhos  49  11-28      PT/INR - ( 28 Nov 2021 05:42 )   PT: 14.4 sec;   INR: 1.22 ratio                    LIVER FUNCTIONS - ( 28 Nov 2021 05:42 )  Alb: 2.4 g/dL / Pro: 7.1 g/dL / ALK PHOS: 49 U/L / ALT: 36 U/L DA / AST: 9 U/L / GGT: x           Culture - Blood (collected 26 Nov 2021 08:37)  Source: .Blood Blood-Peripheral  Preliminary Report (27 Nov 2021 09:01):    No growth to date.    Culture - Blood (collected 26 Nov 2021 08:37)  Source: .Blood Blood-Peripheral  Preliminary Report (27 Nov 2021 09:01):    No growth to date.    Culture - Urine (collected 26 Nov 2021 08:24)  Source: Clean Catch Clean Catch (Midstream)  Final Report (27 Nov 2021 07:14):    No growth                             CXR: Bilateral lung opacities  CTA Chest: Diffuse multifocal pneumonia (greater than 50%). Consideration includes Covid-19.    ECHO:    MEDICATIONS  (STANDING):  dexAMETHasone  Injectable 6 milliGRAM(s) IV Push daily  enoxaparin Injectable 40 milliGRAM(s) SubCutaneous every 12 hours  influenza   Vaccine 0.5 milliLiter(s) IntraMuscular once  pantoprazole    Tablet 40 milliGRAM(s) Oral before breakfast  remdesivir  IVPB   IV Intermittent   remdesivir  IVPB 200 milliGRAM(s) IV Intermittent every 24 hours  sodium chloride 0.9%. 1000 milliLiter(s) (75 mL/Hr) IV Continuous <Continuous>    MEDICATIONS  (PRN):  acetaminophen     Tablet .. 650 milliGRAM(s) Oral every 6 hours PRN Temp greater or equal to 38C (100.4F), Mild Pain (1 - 3)  ALBUTerol    90 MICROgram(s) HFA Inhaler 2 Puff(s) Inhalation every 6 hours PRN Shortness of Breath and/or Wheezing  guaiFENesin Oral Liquid (Sugar-Free) 200 milliGRAM(s) Oral every 6 hours PRN Cough  ondansetron Injectable 4 milliGRAM(s) IV Push every 8 hours PRN Nausea and/or Vomiting

## 2021-11-29 LAB
ALBUMIN SERPL ELPH-MCNC: 2.2 G/DL — LOW (ref 3.5–5)
ALP SERPL-CCNC: 53 U/L — SIGNIFICANT CHANGE UP (ref 40–120)
ALT FLD-CCNC: 75 U/L DA — HIGH (ref 10–60)
ANION GAP SERPL CALC-SCNC: 6 MMOL/L — SIGNIFICANT CHANGE UP (ref 5–17)
AST SERPL-CCNC: 24 U/L — SIGNIFICANT CHANGE UP (ref 10–40)
BILIRUB SERPL-MCNC: 0.6 MG/DL — SIGNIFICANT CHANGE UP (ref 0.2–1.2)
BUN SERPL-MCNC: 21 MG/DL — HIGH (ref 7–18)
CALCIUM SERPL-MCNC: 9 MG/DL — SIGNIFICANT CHANGE UP (ref 8.4–10.5)
CHLORIDE SERPL-SCNC: 103 MMOL/L — SIGNIFICANT CHANGE UP (ref 96–108)
CO2 SERPL-SCNC: 26 MMOL/L — SIGNIFICANT CHANGE UP (ref 22–31)
CREAT SERPL-MCNC: 0.75 MG/DL — SIGNIFICANT CHANGE UP (ref 0.5–1.3)
CRP SERPL-MCNC: 48 MG/L — HIGH
CULTURE RESULTS: SIGNIFICANT CHANGE UP
CULTURE RESULTS: SIGNIFICANT CHANGE UP
D DIMER BLD IA.RAPID-MCNC: 275 NG/ML DDU — HIGH
ERYTHROCYTE [SEDIMENTATION RATE] IN BLOOD: 30 MM/HR — HIGH (ref 0–15)
GLUCOSE SERPL-MCNC: 100 MG/DL — HIGH (ref 70–99)
HCT VFR BLD CALC: 43 % — SIGNIFICANT CHANGE UP (ref 39–50)
HGB BLD-MCNC: 14.5 G/DL — SIGNIFICANT CHANGE UP (ref 13–17)
LDH SERPL L TO P-CCNC: 362 U/L — HIGH (ref 120–225)
MAGNESIUM SERPL-MCNC: 2.4 MG/DL — SIGNIFICANT CHANGE UP (ref 1.6–2.6)
MCHC RBC-ENTMCNC: 28.6 PG — SIGNIFICANT CHANGE UP (ref 27–34)
MCHC RBC-ENTMCNC: 33.7 GM/DL — SIGNIFICANT CHANGE UP (ref 32–36)
MCV RBC AUTO: 84.8 FL — SIGNIFICANT CHANGE UP (ref 80–100)
MRSA PCR RESULT.: SIGNIFICANT CHANGE UP
MRSA PCR RESULT.: SIGNIFICANT CHANGE UP
NRBC # BLD: 0 /100 WBCS — SIGNIFICANT CHANGE UP (ref 0–0)
PHOSPHATE SERPL-MCNC: 3.5 MG/DL — SIGNIFICANT CHANGE UP (ref 2.5–4.5)
PLATELET # BLD AUTO: 373 K/UL — SIGNIFICANT CHANGE UP (ref 150–400)
POTASSIUM SERPL-MCNC: 4.4 MMOL/L — SIGNIFICANT CHANGE UP (ref 3.5–5.3)
POTASSIUM SERPL-SCNC: 4.4 MMOL/L — SIGNIFICANT CHANGE UP (ref 3.5–5.3)
PROCALCITONIN SERPL-MCNC: 0.05 NG/ML — SIGNIFICANT CHANGE UP (ref 0.02–0.1)
PROT SERPL-MCNC: 7.2 G/DL — SIGNIFICANT CHANGE UP (ref 6–8.3)
RBC # BLD: 5.07 M/UL — SIGNIFICANT CHANGE UP (ref 4.2–5.8)
RBC # FLD: 13 % — SIGNIFICANT CHANGE UP (ref 10.3–14.5)
S AUREUS DNA NOSE QL NAA+PROBE: SIGNIFICANT CHANGE UP
S AUREUS DNA NOSE QL NAA+PROBE: SIGNIFICANT CHANGE UP
SODIUM SERPL-SCNC: 135 MMOL/L — SIGNIFICANT CHANGE UP (ref 135–145)
SPECIMEN SOURCE: SIGNIFICANT CHANGE UP
SPECIMEN SOURCE: SIGNIFICANT CHANGE UP
WBC # BLD: 16.71 K/UL — HIGH (ref 3.8–10.5)
WBC # FLD AUTO: 16.71 K/UL — HIGH (ref 3.8–10.5)

## 2021-11-29 RX ADMIN — PANTOPRAZOLE SODIUM 40 MILLIGRAM(S): 20 TABLET, DELAYED RELEASE ORAL at 06:13

## 2021-11-29 RX ADMIN — ENOXAPARIN SODIUM 40 MILLIGRAM(S): 100 INJECTION SUBCUTANEOUS at 05:06

## 2021-11-29 RX ADMIN — CHLORHEXIDINE GLUCONATE 1 APPLICATION(S): 213 SOLUTION TOPICAL at 05:06

## 2021-11-29 RX ADMIN — Medication 6 MILLIGRAM(S): at 05:06

## 2021-11-29 RX ADMIN — ENOXAPARIN SODIUM 40 MILLIGRAM(S): 100 INJECTION SUBCUTANEOUS at 17:42

## 2021-11-29 RX ADMIN — REMDESIVIR 500 MILLIGRAM(S): 5 INJECTION INTRAVENOUS at 13:15

## 2021-11-29 RX ADMIN — Medication 200 MILLIGRAM(S): at 13:04

## 2021-11-29 NOTE — PROGRESS NOTE ADULT - SUBJECTIVE AND OBJECTIVE BOX
INTERVAL HPI/OVERNIGHT EVENTS: ***    PRESSORS: [ ] YES [ ] NO  WHICH:    Antimicrobial:  remdesivir  IVPB   IV Intermittent   remdesivir  IVPB 100 milliGRAM(s) IV Intermittent every 24 hours    Cardiovascular:    Pulmonary:  ALBUTerol    90 MICROgram(s) HFA Inhaler 2 Puff(s) Inhalation every 6 hours PRN  guaiFENesin Oral Liquid (Sugar-Free) 200 milliGRAM(s) Oral every 6 hours PRN    Hematalogic:  enoxaparin Injectable 40 milliGRAM(s) SubCutaneous every 12 hours    Other:  acetaminophen     Tablet .. 650 milliGRAM(s) Oral every 6 hours PRN  chlorhexidine 2% Cloths 1 Application(s) Topical <User Schedule>  dexAMETHasone  Injectable 6 milliGRAM(s) IV Push daily  influenza   Vaccine 0.5 milliLiter(s) IntraMuscular once  ondansetron Injectable 4 milliGRAM(s) IV Push every 8 hours PRN  pantoprazole    Tablet 40 milliGRAM(s) Oral before breakfast  sodium chloride 0.9%. 1000 milliLiter(s) IV Continuous <Continuous>    acetaminophen     Tablet .. 650 milliGRAM(s) Oral every 6 hours PRN  ALBUTerol    90 MICROgram(s) HFA Inhaler 2 Puff(s) Inhalation every 6 hours PRN  chlorhexidine 2% Cloths 1 Application(s) Topical <User Schedule>  dexAMETHasone  Injectable 6 milliGRAM(s) IV Push daily  enoxaparin Injectable 40 milliGRAM(s) SubCutaneous every 12 hours  guaiFENesin Oral Liquid (Sugar-Free) 200 milliGRAM(s) Oral every 6 hours PRN  influenza   Vaccine 0.5 milliLiter(s) IntraMuscular once  ondansetron Injectable 4 milliGRAM(s) IV Push every 8 hours PRN  pantoprazole    Tablet 40 milliGRAM(s) Oral before breakfast  remdesivir  IVPB   IV Intermittent   remdesivir  IVPB 100 milliGRAM(s) IV Intermittent every 24 hours  sodium chloride 0.9%. 1000 milliLiter(s) IV Continuous <Continuous>    Drug Dosing Weight  Height (cm): 188 (28 Nov 2021 19:00)  Weight (kg): 120.1 (28 Nov 2021 19:00)  BMI (kg/m2): 34 (28 Nov 2021 19:00)  BSA (m2): 2.45 (28 Nov 2021 19:00)    CENTRAL LINE: [ ] YES [ ] NO  LOCATION:   DATE INSERTED:  REMOVE: [ ] YES [ ] NO  EXPLAIN:    CRUZ: [ ] YES [ ] NO    DATE INSERTED:  REMOVE:  [ ] YES [ ] NO  EXPLAIN:    A-LINE:  [ ] YES [ ] NO  LOCATION:   DATE INSERTED:  REMOVE:  [ ] YES [ ] NO  EXPLAIN:    PMH -reviewed admission note, no change since admission  PAST MEDICAL & SURGICAL HISTORY:  No pertinent past medical history    No significant past surgical history        ICU Vital Signs Last 24 Hrs  T(C): 36.4 (29 Nov 2021 16:32), Max: 37 (29 Nov 2021 04:00)  T(F): 97.5 (29 Nov 2021 16:32), Max: 98.6 (29 Nov 2021 04:00)  HR: 99 (29 Nov 2021 17:00) (68 - 99)  BP: 105/71 (29 Nov 2021 17:00) (90/52 - 111/66)  BP(mean): 76 (29 Nov 2021 17:00) (61 - 80)  ABP: --  ABP(mean): --  RR: 20 (29 Nov 2021 17:00) (12 - 39)  SpO2: 96% (29 Nov 2021 17:00) (91% - 99%)            11-28 @ 07:01  -  11-29 @ 07:00  --------------------------------------------------------  IN: 775 mL / OUT: 2320 mL / NET: -1545 mL            PHYSICAL EXAM:    GENERAL: NAD, well-groomed, well-developed  HEAD:  Atraumatic, Normocephalic  EYES: EOMI, PERRLA, conjunctiva and sclera clear  ENMT: No tonsillar erythema, exudates, or enlargement; Moist mucous membranes, Good dentition, No lesions  NECK: Supple, normal appearance, No JVD; Normal thyroid; Trachea midline  NERVOUS SYSTEM:  Alert & Oriented X3,  Motor Strength 5/5 B/L upper and lower extremities; DTRs 2+ intact and symmetric  CHEST/LUNG: No chest deformity; Normal percussion bilaterally; No rales, rhonchi, wheezing   HEART: Regular rate and rhythm; No murmurs, rubs, or gallops  ABDOMEN: Soft, Nontender, Nondistended; Bowel sounds present  EXTREMITIES:  2+ Peripheral Pulses, No clubbing, cyanosis, or edema  LYMPH: No lymphadenopathy noted  SKIN: No rashes or lesions;  Good capillary refill      LABS:  CBC Full  -  ( 29 Nov 2021 04:53 )  WBC Count : 16.71 K/uL  RBC Count : 5.07 M/uL  Hemoglobin : 14.5 g/dL  Hematocrit : 43.0 %  Platelet Count - Automated : 373 K/uL  Mean Cell Volume : 84.8 fl  Mean Cell Hemoglobin : 28.6 pg  Mean Cell Hemoglobin Concentration : 33.7 gm/dL  Auto Neutrophil # : x  Auto Lymphocyte # : x  Auto Monocyte # : x  Auto Eosinophil # : x  Auto Basophil # : x  Auto Neutrophil % : x  Auto Lymphocyte % : x  Auto Monocyte % : x  Auto Eosinophil % : x  Auto Basophil % : x    11-29    135  |  103  |  21<H>  ----------------------------<  100<H>  4.4   |  26  |  0.75    Ca    9.0      29 Nov 2021 04:53  Phos  3.5     11-29  Mg     2.4     11-29    TPro  7.2  /  Alb  2.2<L>  /  TBili  0.6  /  DBili  x   /  AST  24  /  ALT  75<H>  /  AlkPhos  53  11-29    PT/INR - ( 28 Nov 2021 05:42 )   PT: 14.4 sec;   INR: 1.22 ratio                 RADIOLOGY & ADDITIONAL STUDIES REVIEWED:  ***    [ ]GOALS OF CARE DISCUSSION WITH PATIENT/FAMILY/PROXY:    CRITICAL CARE TIME SPENT: 35 minutes INTERVAL HPI/OVERNIGHT EVENTS: Pt     PRESSORS: [ ] YES [ ] NO  WHICH:    Antimicrobial:  remdesivir  IVPB   IV Intermittent   remdesivir  IVPB 100 milliGRAM(s) IV Intermittent every 24 hours    Cardiovascular:    Pulmonary:  ALBUTerol    90 MICROgram(s) HFA Inhaler 2 Puff(s) Inhalation every 6 hours PRN  guaiFENesin Oral Liquid (Sugar-Free) 200 milliGRAM(s) Oral every 6 hours PRN    Hematalogic:  enoxaparin Injectable 40 milliGRAM(s) SubCutaneous every 12 hours    Other:  acetaminophen     Tablet .. 650 milliGRAM(s) Oral every 6 hours PRN  chlorhexidine 2% Cloths 1 Application(s) Topical <User Schedule>  dexAMETHasone  Injectable 6 milliGRAM(s) IV Push daily  influenza   Vaccine 0.5 milliLiter(s) IntraMuscular once  ondansetron Injectable 4 milliGRAM(s) IV Push every 8 hours PRN  pantoprazole    Tablet 40 milliGRAM(s) Oral before breakfast  sodium chloride 0.9%. 1000 milliLiter(s) IV Continuous <Continuous>    acetaminophen     Tablet .. 650 milliGRAM(s) Oral every 6 hours PRN  ALBUTerol    90 MICROgram(s) HFA Inhaler 2 Puff(s) Inhalation every 6 hours PRN  chlorhexidine 2% Cloths 1 Application(s) Topical <User Schedule>  dexAMETHasone  Injectable 6 milliGRAM(s) IV Push daily  enoxaparin Injectable 40 milliGRAM(s) SubCutaneous every 12 hours  guaiFENesin Oral Liquid (Sugar-Free) 200 milliGRAM(s) Oral every 6 hours PRN  influenza   Vaccine 0.5 milliLiter(s) IntraMuscular once  ondansetron Injectable 4 milliGRAM(s) IV Push every 8 hours PRN  pantoprazole    Tablet 40 milliGRAM(s) Oral before breakfast  remdesivir  IVPB   IV Intermittent   remdesivir  IVPB 100 milliGRAM(s) IV Intermittent every 24 hours  sodium chloride 0.9%. 1000 milliLiter(s) IV Continuous <Continuous>    Drug Dosing Weight  Height (cm): 188 (28 Nov 2021 19:00)  Weight (kg): 120.1 (28 Nov 2021 19:00)  BMI (kg/m2): 34 (28 Nov 2021 19:00)  BSA (m2): 2.45 (28 Nov 2021 19:00)    CENTRAL LINE: [ ] YES [ ] NO  LOCATION:   DATE INSERTED:  REMOVE: [ ] YES [ ] NO  EXPLAIN:    CRUZ: [ ] YES [ ] NO    DATE INSERTED:  REMOVE:  [ ] YES [ ] NO  EXPLAIN:    A-LINE:  [ ] YES [ ] NO  LOCATION:   DATE INSERTED:  REMOVE:  [ ] YES [ ] NO  EXPLAIN:    PMH -reviewed admission note, no change since admission  PAST MEDICAL & SURGICAL HISTORY:  No pertinent past medical history    No significant past surgical history        ICU Vital Signs Last 24 Hrs  T(C): 36.4 (29 Nov 2021 16:32), Max: 37 (29 Nov 2021 04:00)  T(F): 97.5 (29 Nov 2021 16:32), Max: 98.6 (29 Nov 2021 04:00)  HR: 99 (29 Nov 2021 17:00) (68 - 99)  BP: 105/71 (29 Nov 2021 17:00) (90/52 - 111/66)  BP(mean): 76 (29 Nov 2021 17:00) (61 - 80)  ABP: --  ABP(mean): --  RR: 20 (29 Nov 2021 17:00) (12 - 39)  SpO2: 96% (29 Nov 2021 17:00) (91% - 99%)            11-28 @ 07:01  -  11-29 @ 07:00  --------------------------------------------------------  IN: 775 mL / OUT: 2320 mL / NET: -1545 mL            PHYSICAL EXAM:    GENERAL: NAD, well-groomed, well-developed  HEAD:  Atraumatic, Normocephalic  EYES: EOMI, PERRLA, conjunctiva and sclera clear  ENMT: No tonsillar erythema, exudates, or enlargement; Moist mucous membranes, Good dentition, No lesions  NECK: Supple, normal appearance, No JVD; Normal thyroid; Trachea midline  NERVOUS SYSTEM:  Alert & Oriented X3,  Motor Strength 5/5 B/L upper and lower extremities; DTRs 2+ intact and symmetric  CHEST/LUNG: No chest deformity; Normal percussion bilaterally; No rales, rhonchi, wheezing   HEART: Regular rate and rhythm; No murmurs, rubs, or gallops  ABDOMEN: Soft, Nontender, Nondistended; Bowel sounds present  EXTREMITIES:  2+ Peripheral Pulses, No clubbing, cyanosis, or edema  LYMPH: No lymphadenopathy noted  SKIN: No rashes or lesions;  Good capillary refill      LABS:  CBC Full  -  ( 29 Nov 2021 04:53 )  WBC Count : 16.71 K/uL  RBC Count : 5.07 M/uL  Hemoglobin : 14.5 g/dL  Hematocrit : 43.0 %  Platelet Count - Automated : 373 K/uL  Mean Cell Volume : 84.8 fl  Mean Cell Hemoglobin : 28.6 pg  Mean Cell Hemoglobin Concentration : 33.7 gm/dL  Auto Neutrophil # : x  Auto Lymphocyte # : x  Auto Monocyte # : x  Auto Eosinophil # : x  Auto Basophil # : x  Auto Neutrophil % : x  Auto Lymphocyte % : x  Auto Monocyte % : x  Auto Eosinophil % : x  Auto Basophil % : x    11-29    135  |  103  |  21<H>  ----------------------------<  100<H>  4.4   |  26  |  0.75    Ca    9.0      29 Nov 2021 04:53  Phos  3.5     11-29  Mg     2.4     11-29    TPro  7.2  /  Alb  2.2<L>  /  TBili  0.6  /  DBili  x   /  AST  24  /  ALT  75<H>  /  AlkPhos  53  11-29    PT/INR - ( 28 Nov 2021 05:42 )   PT: 14.4 sec;   INR: 1.22 ratio                 RADIOLOGY & ADDITIONAL STUDIES REVIEWED:  ***    [ ]GOALS OF CARE DISCUSSION WITH PATIENT/FAMILY/PROXY:    CRITICAL CARE TIME SPENT: 35 minutes INTERVAL HPI/OVERNIGHT EVENTS: Pt saturating 93% on 40L in 100% HFNC, no other complaints    PRESSORS: [ ] YES [x ] NO  WHICH:    Antimicrobial:  remdesivir  IVPB   IV Intermittent   remdesivir  IVPB 100 milliGRAM(s) IV Intermittent every 24 hours    Cardiovascular:    Pulmonary:  ALBUTerol    90 MICROgram(s) HFA Inhaler 2 Puff(s) Inhalation every 6 hours PRN  guaiFENesin Oral Liquid (Sugar-Free) 200 milliGRAM(s) Oral every 6 hours PRN    Hematalogic:  enoxaparin Injectable 40 milliGRAM(s) SubCutaneous every 12 hours    Other:  acetaminophen     Tablet .. 650 milliGRAM(s) Oral every 6 hours PRN  chlorhexidine 2% Cloths 1 Application(s) Topical <User Schedule>  dexAMETHasone  Injectable 6 milliGRAM(s) IV Push daily  influenza   Vaccine 0.5 milliLiter(s) IntraMuscular once  ondansetron Injectable 4 milliGRAM(s) IV Push every 8 hours PRN  pantoprazole    Tablet 40 milliGRAM(s) Oral before breakfast  sodium chloride 0.9%. 1000 milliLiter(s) IV Continuous <Continuous>    acetaminophen     Tablet .. 650 milliGRAM(s) Oral every 6 hours PRN  ALBUTerol    90 MICROgram(s) HFA Inhaler 2 Puff(s) Inhalation every 6 hours PRN  chlorhexidine 2% Cloths 1 Application(s) Topical <User Schedule>  dexAMETHasone  Injectable 6 milliGRAM(s) IV Push daily  enoxaparin Injectable 40 milliGRAM(s) SubCutaneous every 12 hours  guaiFENesin Oral Liquid (Sugar-Free) 200 milliGRAM(s) Oral every 6 hours PRN  influenza   Vaccine 0.5 milliLiter(s) IntraMuscular once  ondansetron Injectable 4 milliGRAM(s) IV Push every 8 hours PRN  pantoprazole    Tablet 40 milliGRAM(s) Oral before breakfast  remdesivir  IVPB   IV Intermittent   remdesivir  IVPB 100 milliGRAM(s) IV Intermittent every 24 hours  sodium chloride 0.9%. 1000 milliLiter(s) IV Continuous <Continuous>    Drug Dosing Weight  Height (cm): 188 (28 Nov 2021 19:00)  Weight (kg): 120.1 (28 Nov 2021 19:00)  BMI (kg/m2): 34 (28 Nov 2021 19:00)  BSA (m2): 2.45 (28 Nov 2021 19:00)    CENTRAL LINE: [ ] YES x[ ] NO  LOCATION:   DATE INSERTED:  REMOVE: [ ] YES [ ] NO  EXPLAIN:    CRUZ: [ ] YES [ x] NO    DATE INSERTED:  REMOVE:  [ ] YES [ ] NO  EXPLAIN:    A-LINE:  [ ] YES [x ] NO  LOCATION:   DATE INSERTED:  REMOVE:  [ ] YES [ ] NO  EXPLAIN:    PMH -reviewed admission note, no change since admission  PAST MEDICAL & SURGICAL HISTORY:  No pertinent past medical history    No significant past surgical history        ICU Vital Signs Last 24 Hrs  T(C): 36.4 (29 Nov 2021 16:32), Max: 37 (29 Nov 2021 04:00)  T(F): 97.5 (29 Nov 2021 16:32), Max: 98.6 (29 Nov 2021 04:00)  HR: 99 (29 Nov 2021 17:00) (68 - 99)  BP: 105/71 (29 Nov 2021 17:00) (90/52 - 111/66)  BP(mean): 76 (29 Nov 2021 17:00) (61 - 80)  ABP: --  ABP(mean): --  RR: 20 (29 Nov 2021 17:00) (12 - 39)  SpO2: 96% (29 Nov 2021 17:00) (91% - 99%)            11-28 @ 07:01  -  11-29 @ 07:00  --------------------------------------------------------  IN: 775 mL / OUT: 2320 mL / NET: -1545 mL            PHYSICAL EXAM:    GENERAL: young male resting in bed using HFNC  HEAD:  Atraumatic, Normocephalic  EYES: EOMI, PERRLA, conjunctiva and sclera clear  ENMT: No tonsillar erythema, exudates, or enlargement; Moist mucous membranes, Good dentition, No lesions  NECK: Supple, normal appearance, No JVD; Normal thyroid; Trachea midline  NERVOUS SYSTEM:  Alert & Oriented X3,  Motor Strength 5/5 B/L upper and lower extremities; DTRs 2+ intact and symmetric  CHEST/LUNG: No chest deformity; Normal percussion bilaterally; No rales, rhonchi, wheezing   HEART: Regular rate and rhythm; No murmurs, rubs, or gallops  ABDOMEN: Soft, Nontender, Nondistended; Bowel sounds present  EXTREMITIES:  2+ Peripheral Pulses, No clubbing, cyanosis, or edema  LYMPH: No lymphadenopathy noted  SKIN: No rashes or lesions;  Good capillary refill      LABS:  CBC Full  -  ( 29 Nov 2021 04:53 )  WBC Count : 16.71 K/uL  RBC Count : 5.07 M/uL  Hemoglobin : 14.5 g/dL  Hematocrit : 43.0 %  Platelet Count - Automated : 373 K/uL  Mean Cell Volume : 84.8 fl  Mean Cell Hemoglobin : 28.6 pg  Mean Cell Hemoglobin Concentration : 33.7 gm/dL  Auto Neutrophil # : x  Auto Lymphocyte # : x  Auto Monocyte # : x  Auto Eosinophil # : x  Auto Basophil # : x  Auto Neutrophil % : x  Auto Lymphocyte % : x  Auto Monocyte % : x  Auto Eosinophil % : x  Auto Basophil % : x    11-29    135  |  103  |  21<H>  ----------------------------<  100<H>  4.4   |  26  |  0.75    Ca    9.0      29 Nov 2021 04:53  Phos  3.5     11-29  Mg     2.4     11-29    TPro  7.2  /  Alb  2.2<L>  /  TBili  0.6  /  DBili  x   /  AST  24  /  ALT  75<H>  /  AlkPhos  53  11-29    PT/INR - ( 28 Nov 2021 05:42 )   PT: 14.4 sec;   INR: 1.22 ratio                 RADIOLOGY & ADDITIONAL STUDIES REVIEWED:  ***    [ ]GOALS OF CARE DISCUSSION WITH PATIENT/FAMILY/PROXY:    CRITICAL CARE TIME SPENT: 35 minutes

## 2021-11-29 NOTE — PROGRESS NOTE ADULT - ASSESSMENT
ASSESSMENT:  35 year old man with no significant past medical hx came to ED c/o worsening cough, sob, fever, chills, headache, nausea, vomiting, diarrhea, poor oral intake. Patient is unvaccinated for COVID19, tested positive on 11/23. Initially patient's oxygen requirement was low but has been increasing since admission. He was on 6L NC but desaturating into 80s and tachypneic Patient now on NRB 10L saturating 94-95% and is tachypneic in the 30s. Pt will benefit from HFNC, hence patient will be transferred to ICU for further management.      #Acute Hypoxic Respiratory Failure  #COVID-19 Pneumonia    PLAN:  #NEURO  Pt is AAOx3  No active issues    #PULMONARY  *Acute hypoxemic respiratory failure due to COVID-19 Pneumonia.   P/w cough, sob, fever, leucocytosis, tachycardia   - CXR showed worsening bilateral patchy opacities  - Worsening hypoxia and tachypneic requiring HFNC  - COVID19 PCR +ve on 11/23  - D dimer mild elevated  271>>246  - C/w Tylenol, Albuterol Inhaler  - c/w with Zofran and Robitussin PRN  - C/w Decadron 6 mg IV daily for 10 days  - started on Remdesivir  - Isolation precautions  - Blood Cx NGTD   - CT angio negative for PE, c/w prophylactic lovenox for AC  - f/u daily biomarkers.    #CARDIOVASCULAR  - HR and BPs controlled  - EKG showed NSR  - D-dimer 271>>246  - CTA chest negative for PE    #GASTROINTESTINAL  - On protonix for stress ulcer prophylaxis    #RENAL  - BU/Creat 16/0.94  - No active issues    #INFECTIOUS DISEASE  *COVID PNA  - management same as above    #ENDOCRINE  - Blood sugars WNL  - Monitor BG levels considering pt is on decadron    #HEME  *Leucocytosis  - Whit count 18.38>>14.32  - Due to COVID-19 PNA infection    #PROPHYLAXIS  - DVT ppx with lovenox sq  - GI ppx with Protonix    DISPO:  Transfer to ICU  CODE STATUS:  FULL CODE       ASSESSMENT:  35 year old man with no significant past medical hx came to ED c/o worsening cough, sob, fever, chills, headache, nausea, vomiting, diarrhea, poor oral intake. Patient is unvaccinated for COVID19, tested positive on 11/23. Initially patient's oxygen requirement was low but has been increasing since admission. He was on 6L NC but desaturating into 80s and tachypneic Patient now on NRB 10L saturating 94-95% and is tachypneic in the 30s. Pt will benefit from HFNC, hence patient will be transferred to ICU for further management.      #Acute Hypoxic Respiratory Failure  #COVID-19 Pneumonia    PLAN:  #NEURO  Pt is AAOx3  No active issues    #PULMONARY  *Acute hypoxemic respiratory failure due to COVID-19 Pneumonia.   P/w cough, sob, fever, leucocytosis, tachycardia   - CXR showed worsening bilateral patchy opacities  - Worsening hypoxia and tachypneic requiring HFNC  - COVID19 PCR +ve on 11/23  - D dimer mild elevated  271>>246> 275 11/29  - C/w Tylenol, Albuterol Inhaler  - c/w with Zofran and Robitussin PRN  - C/w Decadron 6 mg IV daily for 10 days, today is day 4, 11/29  - started on Remdesivir, today is day 2, 11/29  - Isolation precautions  - Blood Cx NGTD   - CT angio negative for PE, c/w prophylactic lovenox for AC  - f/u daily biomarkers.    #CARDIOVASCULAR  - HR and BPs controlled  - EKG showed NSR  - D-dimer 271>>246>>275 11/29  - CTA chest negative for PE    #GASTROINTESTINAL  - On protonix for stress ulcer prophylaxis    #RENAL  - BU/Creat 16/0.94  - No active issues    #INFECTIOUS DISEASE  *COVID PNA  - management same as above    #ENDOCRINE  - Blood sugars WNL  - Monitor BG levels considering pt is on decadron    #HEME  *Leucocytosis  - Whit count 18.38>>14.32>16.7k 11/29  - Due to COVID-19 PNA infection    #PROPHYLAXIS  - DVT ppx with lovenox sq 40 BID  - GI ppx with Protonix    DISPO:  Transfer to ICU  CODE STATUS:  FULL CODE

## 2021-11-30 LAB
ALBUMIN SERPL ELPH-MCNC: 2.1 G/DL — LOW (ref 3.5–5)
ALP SERPL-CCNC: 52 U/L — SIGNIFICANT CHANGE UP (ref 40–120)
ALT FLD-CCNC: 68 U/L DA — HIGH (ref 10–60)
ANION GAP SERPL CALC-SCNC: 7 MMOL/L — SIGNIFICANT CHANGE UP (ref 5–17)
AST SERPL-CCNC: 15 U/L — SIGNIFICANT CHANGE UP (ref 10–40)
BASOPHILS # BLD AUTO: 0 K/UL — SIGNIFICANT CHANGE UP (ref 0–0.2)
BASOPHILS NFR BLD AUTO: 0 % — SIGNIFICANT CHANGE UP (ref 0–2)
BILIRUB SERPL-MCNC: 0.5 MG/DL — SIGNIFICANT CHANGE UP (ref 0.2–1.2)
BUN SERPL-MCNC: 19 MG/DL — HIGH (ref 7–18)
CALCIUM SERPL-MCNC: 8.5 MG/DL — SIGNIFICANT CHANGE UP (ref 8.4–10.5)
CHLORIDE SERPL-SCNC: 102 MMOL/L — SIGNIFICANT CHANGE UP (ref 96–108)
CO2 SERPL-SCNC: 25 MMOL/L — SIGNIFICANT CHANGE UP (ref 22–31)
CREAT SERPL-MCNC: 0.73 MG/DL — SIGNIFICANT CHANGE UP (ref 0.5–1.3)
D DIMER BLD IA.RAPID-MCNC: 295 NG/ML DDU — HIGH
EOSINOPHIL # BLD AUTO: 0.49 K/UL — SIGNIFICANT CHANGE UP (ref 0–0.5)
EOSINOPHIL NFR BLD AUTO: 3 % — SIGNIFICANT CHANGE UP (ref 0–6)
GLUCOSE SERPL-MCNC: 103 MG/DL — HIGH (ref 70–99)
HCT VFR BLD CALC: 44.1 % — SIGNIFICANT CHANGE UP (ref 39–50)
HGB BLD-MCNC: 14.8 G/DL — SIGNIFICANT CHANGE UP (ref 13–17)
LYMPHOCYTES # BLD AUTO: 16 % — SIGNIFICANT CHANGE UP (ref 13–44)
LYMPHOCYTES # BLD AUTO: 2.62 K/UL — SIGNIFICANT CHANGE UP (ref 1–3.3)
MAGNESIUM SERPL-MCNC: 2.3 MG/DL — SIGNIFICANT CHANGE UP (ref 1.6–2.6)
MANUAL SMEAR VERIFICATION: SIGNIFICANT CHANGE UP
MCHC RBC-ENTMCNC: 28.4 PG — SIGNIFICANT CHANGE UP (ref 27–34)
MCHC RBC-ENTMCNC: 33.6 GM/DL — SIGNIFICANT CHANGE UP (ref 32–36)
MCV RBC AUTO: 84.5 FL — SIGNIFICANT CHANGE UP (ref 80–100)
METAMYELOCYTES # FLD: 1 % — HIGH (ref 0–0)
MONOCYTES # BLD AUTO: 1.8 K/UL — HIGH (ref 0–0.9)
MONOCYTES NFR BLD AUTO: 11 % — SIGNIFICANT CHANGE UP (ref 2–14)
NEUTROPHILS # BLD AUTO: 11.31 K/UL — HIGH (ref 1.8–7.4)
NEUTROPHILS NFR BLD AUTO: 69 % — SIGNIFICANT CHANGE UP (ref 43–77)
NRBC # BLD: 0 /100 — SIGNIFICANT CHANGE UP (ref 0–0)
PHOSPHATE SERPL-MCNC: 3.1 MG/DL — SIGNIFICANT CHANGE UP (ref 2.5–4.5)
PLAT MORPH BLD: NORMAL — SIGNIFICANT CHANGE UP
PLATELET # BLD AUTO: 421 K/UL — HIGH (ref 150–400)
POLYCHROMASIA BLD QL SMEAR: SLIGHT — SIGNIFICANT CHANGE UP
POTASSIUM SERPL-MCNC: 4.6 MMOL/L — SIGNIFICANT CHANGE UP (ref 3.5–5.3)
POTASSIUM SERPL-SCNC: 4.6 MMOL/L — SIGNIFICANT CHANGE UP (ref 3.5–5.3)
PROT SERPL-MCNC: 7 G/DL — SIGNIFICANT CHANGE UP (ref 6–8.3)
RBC # BLD: 5.22 M/UL — SIGNIFICANT CHANGE UP (ref 4.2–5.8)
RBC # FLD: 12.3 % — SIGNIFICANT CHANGE UP (ref 10.3–14.5)
RBC BLD AUTO: ABNORMAL
SODIUM SERPL-SCNC: 134 MMOL/L — LOW (ref 135–145)
WBC # BLD: 16.39 K/UL — HIGH (ref 3.8–10.5)
WBC # FLD AUTO: 16.39 K/UL — HIGH (ref 3.8–10.5)

## 2021-11-30 PROCEDURE — 71045 X-RAY EXAM CHEST 1 VIEW: CPT | Mod: 26

## 2021-11-30 RX ADMIN — REMDESIVIR 500 MILLIGRAM(S): 5 INJECTION INTRAVENOUS at 13:38

## 2021-11-30 RX ADMIN — PANTOPRAZOLE SODIUM 40 MILLIGRAM(S): 20 TABLET, DELAYED RELEASE ORAL at 05:02

## 2021-11-30 RX ADMIN — ENOXAPARIN SODIUM 40 MILLIGRAM(S): 100 INJECTION SUBCUTANEOUS at 17:32

## 2021-11-30 RX ADMIN — CHLORHEXIDINE GLUCONATE 1 APPLICATION(S): 213 SOLUTION TOPICAL at 10:31

## 2021-11-30 RX ADMIN — Medication 6 MILLIGRAM(S): at 05:02

## 2021-11-30 RX ADMIN — ENOXAPARIN SODIUM 40 MILLIGRAM(S): 100 INJECTION SUBCUTANEOUS at 05:02

## 2021-11-30 NOTE — PROGRESS NOTE ADULT - ASSESSMENT
ASSESSMENT:  35 year old man with no significant past medical hx came to ED c/o worsening cough, sob, fever, chills, headache, nausea, vomiting, diarrhea, poor oral intake. Patient is unvaccinated for COVID19, tested positive on 11/23. Initially patient's oxygen requirement was low but has been increasing since admission. He was on 6L NC but desaturating into 80s and tachypneic Patient now on NRB 10L saturating 94-95% and is tachypneic in the 30s. Pt will benefit from HFNC, hence patient will be transferred to ICU for further management.      #Acute Hypoxic Respiratory Failure  #COVID-19 Pneumonia    PLAN:  #NEURO  Pt is AAOx3  No active issues    #PULMONARY  *Acute hypoxemic respiratory failure due to COVID-19 Pneumonia.   P/w cough, sob, fever, leucocytosis, tachycardia   - CXR showed worsening bilateral patchy opacities  - Worsening hypoxia and tachypneic requiring HFNC  - COVID19 PCR +ve on 11/23  - D dimer mild elevated  271>>246  - C/w Tylenol, Albuterol Inhaler  - c/w with Zofran and Robitussin PRN  - C/w Decadron 6 mg IV daily for 10 days  - started on Remdesivir  - Isolation precautions  - Blood Cx NGTD   - CT angio negative for PE, c/w prophylactic lovenox for AC  - f/u daily biomarkers.    #CARDIOVASCULAR  - HR and BPs controlled  - EKG showed NSR  - D-dimer 271>>246  - CTA chest negative for PE    #GASTROINTESTINAL  - On protonix for stress ulcer prophylaxis    #RENAL  - BU/Creat 16/0.94  - No active issues    #INFECTIOUS DISEASE  *COVID PNA  - management same as above    #ENDOCRINE  - Blood sugars WNL  - Monitor BG levels considering pt is on decadron    #HEME  *Leucocytosis  - Whit count 18.38>>14.32  - Due to COVID-19 PNA infection    #PROPHYLAXIS  - DVT ppx with lovenox sq  - GI ppx with Protonix    DISPO:  Transfer to ICU  CODE STATUS:  FULL CODE       ASSESSMENT:  35 year old man with no significant past medical hx came to ED c/o worsening cough, sob, fever, chills, headache, nausea, vomiting, diarrhea, poor oral intake. Patient is unvaccinated for COVID19, tested positive on 11/23. Initially patient's oxygen requirement was low but has been increasing since admission. He was on 6L NC but desaturating into 80s and tachypneic Patient now on NRB 10L saturating 94-95% and is tachypneic in the 30s. Pt will benefit from HFNC, hence patient will be transferred to ICU for further management.      #Acute Hypoxic Respiratory Failure  #COVID-19 Pneumonia    PLAN:  #NEURO  Pt is AAOx3  No active issues    #PULMONARY  *Acute hypoxemic respiratory failure due to COVID-19 Pneumonia.   P/w cough, sob, fever, leucocytosis, tachycardia   - CXR showed worsening bilateral patchy opacities  - Worsening hypoxia and tachypneic requiring HFNC  - COVID19 PCR +ve on 11/23  - D dimer mild elevated  271>>246  - C/w Tylenol, Albuterol Inhaler  - c/w with Zofran and Robitussin PRN  - C/w Decadron 6 mg IV daily for 10 days  - started on Remdesivir  - Isolation precautions  - Blood Cx NGTD   - CT angio negative for PE, c/w prophylactic lovenox for AC  - f/u daily biomarkers.    #CARDIOVASCULAR  - HR and BPs controlled  - EKG showed NSR  - D-dimer 271>>246>> 295 11/30  - CTA chest negative for PE    #GASTROINTESTINAL  - On protonix for stress ulcer prophylaxis    #RENAL  - BU/Creat 16/0.94  - No active issues    #INFECTIOUS DISEASE  *COVID PNA  - management same as above    #ENDOCRINE  - Blood sugars WNL  - Monitor BG levels considering pt is on decadron    #HEME  *Leucocytosis  - Whit count 18.38>>14.32>> 16 k 11/30  - Due to COVID-19 PNA infection    #PROPHYLAXIS  - DVT ppx with lovenox sq  - GI ppx with Protonix    DISPO:  Transfer to ICU  CODE STATUS:  FULL CODE

## 2021-11-30 NOTE — PROGRESS NOTE ADULT - SUBJECTIVE AND OBJECTIVE BOX
INTERVAL HPI/OVERNIGHT EVENTS: ***    PRESSORS: [ ] YES [ ] NO  WHICH:    Antimicrobial:  remdesivir  IVPB   IV Intermittent   remdesivir  IVPB 100 milliGRAM(s) IV Intermittent every 24 hours    Cardiovascular:    Pulmonary:  ALBUTerol    90 MICROgram(s) HFA Inhaler 2 Puff(s) Inhalation every 6 hours PRN  guaiFENesin Oral Liquid (Sugar-Free) 200 milliGRAM(s) Oral every 6 hours PRN    Hematalogic:  enoxaparin Injectable 40 milliGRAM(s) SubCutaneous every 12 hours    Other:  acetaminophen     Tablet .. 650 milliGRAM(s) Oral every 6 hours PRN  chlorhexidine 2% Cloths 1 Application(s) Topical <User Schedule>  dexAMETHasone  Injectable 6 milliGRAM(s) IV Push daily  influenza   Vaccine 0.5 milliLiter(s) IntraMuscular once  ondansetron Injectable 4 milliGRAM(s) IV Push every 8 hours PRN  pantoprazole    Tablet 40 milliGRAM(s) Oral before breakfast  sodium chloride 0.9%. 1000 milliLiter(s) IV Continuous <Continuous>    acetaminophen     Tablet .. 650 milliGRAM(s) Oral every 6 hours PRN  ALBUTerol    90 MICROgram(s) HFA Inhaler 2 Puff(s) Inhalation every 6 hours PRN  chlorhexidine 2% Cloths 1 Application(s) Topical <User Schedule>  dexAMETHasone  Injectable 6 milliGRAM(s) IV Push daily  enoxaparin Injectable 40 milliGRAM(s) SubCutaneous every 12 hours  guaiFENesin Oral Liquid (Sugar-Free) 200 milliGRAM(s) Oral every 6 hours PRN  influenza   Vaccine 0.5 milliLiter(s) IntraMuscular once  ondansetron Injectable 4 milliGRAM(s) IV Push every 8 hours PRN  pantoprazole    Tablet 40 milliGRAM(s) Oral before breakfast  remdesivir  IVPB   IV Intermittent   remdesivir  IVPB 100 milliGRAM(s) IV Intermittent every 24 hours  sodium chloride 0.9%. 1000 milliLiter(s) IV Continuous <Continuous>    Drug Dosing Weight  Height (cm): 188 (28 Nov 2021 19:00)  Weight (kg): 120.1 (28 Nov 2021 19:00)  BMI (kg/m2): 34 (28 Nov 2021 19:00)  BSA (m2): 2.45 (28 Nov 2021 19:00)    CENTRAL LINE: [ ] YES [ ] NO  LOCATION:   DATE INSERTED:  REMOVE: [ ] YES [ ] NO  EXPLAIN:    CRUZ: [ ] YES [ ] NO    DATE INSERTED:  REMOVE:  [ ] YES [ ] NO  EXPLAIN:    A-LINE:  [ ] YES [ ] NO  LOCATION:   DATE INSERTED:  REMOVE:  [ ] YES [ ] NO  EXPLAIN:    PMH -reviewed admission note, no change since admission  PAST MEDICAL & SURGICAL HISTORY:  No pertinent past medical history    No significant past surgical history        ICU Vital Signs Last 24 Hrs  T(C): 36.1 (30 Nov 2021 15:17), Max: 36.7 (30 Nov 2021 08:00)  T(F): 97 (30 Nov 2021 15:17), Max: 98 (30 Nov 2021 08:00)  HR: 88 (30 Nov 2021 15:00) (63 - 115)  BP: 93/60 (30 Nov 2021 15:00) (93/60 - 114/84)  BP(mean): 67 (30 Nov 2021 15:00) (62 - 90)  ABP: --  ABP(mean): --  RR: 36 (30 Nov 2021 15:00) (17 - 36)  SpO2: 92% (30 Nov 2021 15:00) (90% - 99%)            11-29 @ 07:01  -  11-30 @ 07:00  --------------------------------------------------------  IN: 570 mL / OUT: 3700 mL / NET: -3130 mL            PHYSICAL EXAM:    GENERAL: NAD, well-groomed, well-developed  HEAD:  Atraumatic, Normocephalic  EYES: EOMI, PERRLA, conjunctiva and sclera clear  ENMT: No tonsillar erythema, exudates, or enlargement; Moist mucous membranes, Good dentition, No lesions  NECK: Supple, normal appearance, No JVD; Normal thyroid; Trachea midline  NERVOUS SYSTEM:  Alert & Oriented X3,  Motor Strength 5/5 B/L upper and lower extremities; DTRs 2+ intact and symmetric  CHEST/LUNG: No chest deformity; Normal percussion bilaterally; No rales, rhonchi, wheezing   HEART: Regular rate and rhythm; No murmurs, rubs, or gallops  ABDOMEN: Soft, Nontender, Nondistended; Bowel sounds present  EXTREMITIES:  2+ Peripheral Pulses, No clubbing, cyanosis, or edema  LYMPH: No lymphadenopathy noted  SKIN: No rashes or lesions;  Good capillary refill      LABS:  CBC Full  -  ( 30 Nov 2021 04:40 )  WBC Count : 16.39 K/uL  RBC Count : 5.22 M/uL  Hemoglobin : 14.8 g/dL  Hematocrit : 44.1 %  Platelet Count - Automated : 421 K/uL  Mean Cell Volume : 84.5 fl  Mean Cell Hemoglobin : 28.4 pg  Mean Cell Hemoglobin Concentration : 33.6 gm/dL  Auto Neutrophil # : 11.31 K/uL  Auto Lymphocyte # : 2.62 K/uL  Auto Monocyte # : 1.80 K/uL  Auto Eosinophil # : 0.49 K/uL  Auto Basophil # : 0.00 K/uL  Auto Neutrophil % : 69.0 %  Auto Lymphocyte % : 16.0 %  Auto Monocyte % : 11.0 %  Auto Eosinophil % : 3.0 %  Auto Basophil % : 0.0 %    11-30    134<L>  |  102  |  19<H>  ----------------------------<  103<H>  4.6   |  25  |  0.73    Ca    8.5      30 Nov 2021 04:40  Phos  3.1     11-30  Mg     2.3     11-30    TPro  7.0  /  Alb  2.1<L>  /  TBili  0.5  /  DBili  x   /  AST  15  /  ALT  68<H>  /  AlkPhos  52  11-30            RADIOLOGY & ADDITIONAL STUDIES REVIEWED:  ***    [ ]GOALS OF CARE DISCUSSION WITH PATIENT/FAMILY/PROXY:    CRITICAL CARE TIME SPENT: 35 minutes INTERVAL HPI/OVERNIGHT EVENTS: Pt transitioned from HFNC-> NRB-> 5 liter Nasal cannula saturating in the 90s    PRESSORS: [ ] YES [x ] NO  WHICH:    Antimicrobial:  remdesivir  IVPB   IV Intermittent   remdesivir  IVPB 100 milliGRAM(s) IV Intermittent every 24 hours    Cardiovascular:    Pulmonary:  ALBUTerol    90 MICROgram(s) HFA Inhaler 2 Puff(s) Inhalation every 6 hours PRN  guaiFENesin Oral Liquid (Sugar-Free) 200 milliGRAM(s) Oral every 6 hours PRN    Hematalogic:  enoxaparin Injectable 40 milliGRAM(s) SubCutaneous every 12 hours    Other:  acetaminophen     Tablet .. 650 milliGRAM(s) Oral every 6 hours PRN  chlorhexidine 2% Cloths 1 Application(s) Topical <User Schedule>  dexAMETHasone  Injectable 6 milliGRAM(s) IV Push daily  influenza   Vaccine 0.5 milliLiter(s) IntraMuscular once  ondansetron Injectable 4 milliGRAM(s) IV Push every 8 hours PRN  pantoprazole    Tablet 40 milliGRAM(s) Oral before breakfast  sodium chloride 0.9%. 1000 milliLiter(s) IV Continuous <Continuous>    acetaminophen     Tablet .. 650 milliGRAM(s) Oral every 6 hours PRN  ALBUTerol    90 MICROgram(s) HFA Inhaler 2 Puff(s) Inhalation every 6 hours PRN  chlorhexidine 2% Cloths 1 Application(s) Topical <User Schedule>  dexAMETHasone  Injectable 6 milliGRAM(s) IV Push daily  enoxaparin Injectable 40 milliGRAM(s) SubCutaneous every 12 hours  guaiFENesin Oral Liquid (Sugar-Free) 200 milliGRAM(s) Oral every 6 hours PRN  influenza   Vaccine 0.5 milliLiter(s) IntraMuscular once  ondansetron Injectable 4 milliGRAM(s) IV Push every 8 hours PRN  pantoprazole    Tablet 40 milliGRAM(s) Oral before breakfast  remdesivir  IVPB   IV Intermittent   remdesivir  IVPB 100 milliGRAM(s) IV Intermittent every 24 hours  sodium chloride 0.9%. 1000 milliLiter(s) IV Continuous <Continuous>    Drug Dosing Weight  Height (cm): 188 (28 Nov 2021 19:00)  Weight (kg): 120.1 (28 Nov 2021 19:00)  BMI (kg/m2): 34 (28 Nov 2021 19:00)  BSA (m2): 2.45 (28 Nov 2021 19:00)    CENTRAL LINE: [ ] YES [x ] NO  LOCATION:   DATE INSERTED:  REMOVE: [ ] YES [ ] NO  EXPLAIN:    CRUZ: [ ] YES [x ] NO    DATE INSERTED:  REMOVE:  [ ] YES [ ] NO  EXPLAIN:    A-LINE:  [ ] YES [x ] NO  LOCATION:   DATE INSERTED:  REMOVE:  [ ] YES [ ] NO  EXPLAIN:    PMH -reviewed admission note, no change since admission  PAST MEDICAL & SURGICAL HISTORY:  No pertinent past medical history    No significant past surgical history        ICU Vital Signs Last 24 Hrs  T(C): 36.1 (30 Nov 2021 15:17), Max: 36.7 (30 Nov 2021 08:00)  T(F): 97 (30 Nov 2021 15:17), Max: 98 (30 Nov 2021 08:00)  HR: 88 (30 Nov 2021 15:00) (63 - 115)  BP: 93/60 (30 Nov 2021 15:00) (93/60 - 114/84)  BP(mean): 67 (30 Nov 2021 15:00) (62 - 90)  ABP: --  ABP(mean): --  RR: 36 (30 Nov 2021 15:00) (17 - 36)  SpO2: 92% (30 Nov 2021 15:00) (90% - 99%)            11-29 @ 07:01  -  11-30 @ 07:00  --------------------------------------------------------  IN: 570 mL / OUT: 3700 mL / NET: -3130 mL            PHYSICAL EXAM:    GENERAL: young male sitting upright not in distress  HEAD:  Atraumatic, Normocephalic  EYES: EOMI, PERRLA, conjunctiva and sclera clear  ENMT: No tonsillar erythema, exudates, or enlargement; Moist mucous membranes, Good dentition, No lesions  NECK: Supple, normal appearance, No JVD; Normal thyroid; Trachea midline  NERVOUS SYSTEM:  Alert & Oriented X3,  Motor Strength 5/5 B/L upper and lower extremities; DTRs 2+ intact and symmetric  CHEST/LUNG: decreased breathe sounds  HEART: Regular rate and rhythm; No murmurs, rubs, or gallops  ABDOMEN: Soft, Nontender, Nondistended; Bowel sounds present  EXTREMITIES:  2+ Peripheral Pulses, No clubbing, cyanosis, or edema  LYMPH: No lymphadenopathy noted  SKIN: No rashes or lesions;  Good capillary refill      LABS:  CBC Full  -  ( 30 Nov 2021 04:40 )  WBC Count : 16.39 K/uL  RBC Count : 5.22 M/uL  Hemoglobin : 14.8 g/dL  Hematocrit : 44.1 %  Platelet Count - Automated : 421 K/uL  Mean Cell Volume : 84.5 fl  Mean Cell Hemoglobin : 28.4 pg  Mean Cell Hemoglobin Concentration : 33.6 gm/dL  Auto Neutrophil # : 11.31 K/uL  Auto Lymphocyte # : 2.62 K/uL  Auto Monocyte # : 1.80 K/uL  Auto Eosinophil # : 0.49 K/uL  Auto Basophil # : 0.00 K/uL  Auto Neutrophil % : 69.0 %  Auto Lymphocyte % : 16.0 %  Auto Monocyte % : 11.0 %  Auto Eosinophil % : 3.0 %  Auto Basophil % : 0.0 %    11-30    134<L>  |  102  |  19<H>  ----------------------------<  103<H>  4.6   |  25  |  0.73    Ca    8.5      30 Nov 2021 04:40  Phos  3.1     11-30  Mg     2.3     11-30    TPro  7.0  /  Alb  2.1<L>  /  TBili  0.5  /  DBili  x   /  AST  15  /  ALT  68<H>  /  AlkPhos  52  11-30            RADIOLOGY & ADDITIONAL STUDIES REVIEWED:  ***    [ ]GOALS OF CARE DISCUSSION WITH PATIENT/FAMILY/PROXY:    CRITICAL CARE TIME SPENT: 35 minutes

## 2021-12-01 LAB
BASOPHILS # BLD AUTO: 0.13 K/UL — SIGNIFICANT CHANGE UP (ref 0–0.2)
BASOPHILS NFR BLD AUTO: 0.7 % — SIGNIFICANT CHANGE UP (ref 0–2)
CRP SERPL-MCNC: 32 MG/L — HIGH
CULTURE RESULTS: SIGNIFICANT CHANGE UP
CULTURE RESULTS: SIGNIFICANT CHANGE UP
D DIMER BLD IA.RAPID-MCNC: 305 NG/ML DDU — HIGH
EOSINOPHIL # BLD AUTO: 0.4 K/UL — SIGNIFICANT CHANGE UP (ref 0–0.5)
EOSINOPHIL NFR BLD AUTO: 2.3 % — SIGNIFICANT CHANGE UP (ref 0–6)
ERYTHROCYTE [SEDIMENTATION RATE] IN BLOOD: 20 MM/HR — HIGH (ref 0–15)
HCT VFR BLD CALC: 45.3 % — SIGNIFICANT CHANGE UP (ref 39–50)
HGB BLD-MCNC: 15.4 G/DL — SIGNIFICANT CHANGE UP (ref 13–17)
IMM GRANULOCYTES NFR BLD AUTO: 6.7 % — HIGH (ref 0–1.5)
LDH SERPL L TO P-CCNC: 334 U/L — HIGH (ref 120–225)
LYMPHOCYTES # BLD AUTO: 15.3 % — SIGNIFICANT CHANGE UP (ref 13–44)
LYMPHOCYTES # BLD AUTO: 2.68 K/UL — SIGNIFICANT CHANGE UP (ref 1–3.3)
MAGNESIUM SERPL-MCNC: 2.5 MG/DL — SIGNIFICANT CHANGE UP (ref 1.6–2.6)
MCHC RBC-ENTMCNC: 28.7 PG — SIGNIFICANT CHANGE UP (ref 27–34)
MCHC RBC-ENTMCNC: 34 GM/DL — SIGNIFICANT CHANGE UP (ref 32–36)
MCV RBC AUTO: 84.4 FL — SIGNIFICANT CHANGE UP (ref 80–100)
MONOCYTES # BLD AUTO: 1.56 K/UL — HIGH (ref 0–0.9)
MONOCYTES NFR BLD AUTO: 8.9 % — SIGNIFICANT CHANGE UP (ref 2–14)
NEUTROPHILS # BLD AUTO: 11.61 K/UL — HIGH (ref 1.8–7.4)
NEUTROPHILS NFR BLD AUTO: 66.1 % — SIGNIFICANT CHANGE UP (ref 43–77)
NRBC # BLD: 0 /100 WBCS — SIGNIFICANT CHANGE UP (ref 0–0)
PHOSPHATE SERPL-MCNC: 3 MG/DL — SIGNIFICANT CHANGE UP (ref 2.5–4.5)
PLATELET # BLD AUTO: 493 K/UL — HIGH (ref 150–400)
PROCALCITONIN SERPL-MCNC: 0.04 NG/ML — SIGNIFICANT CHANGE UP (ref 0.02–0.1)
RBC # BLD: 5.37 M/UL — SIGNIFICANT CHANGE UP (ref 4.2–5.8)
RBC # FLD: 12.5 % — SIGNIFICANT CHANGE UP (ref 10.3–14.5)
SPECIMEN SOURCE: SIGNIFICANT CHANGE UP
SPECIMEN SOURCE: SIGNIFICANT CHANGE UP
WBC # BLD: 17.55 K/UL — HIGH (ref 3.8–10.5)
WBC # FLD AUTO: 17.55 K/UL — HIGH (ref 3.8–10.5)

## 2021-12-01 RX ADMIN — REMDESIVIR 500 MILLIGRAM(S): 5 INJECTION INTRAVENOUS at 14:51

## 2021-12-01 RX ADMIN — ENOXAPARIN SODIUM 40 MILLIGRAM(S): 100 INJECTION SUBCUTANEOUS at 17:12

## 2021-12-01 RX ADMIN — Medication 6 MILLIGRAM(S): at 06:07

## 2021-12-01 RX ADMIN — ENOXAPARIN SODIUM 40 MILLIGRAM(S): 100 INJECTION SUBCUTANEOUS at 06:06

## 2021-12-01 RX ADMIN — PANTOPRAZOLE SODIUM 40 MILLIGRAM(S): 20 TABLET, DELAYED RELEASE ORAL at 06:06

## 2021-12-01 RX ADMIN — CHLORHEXIDINE GLUCONATE 1 APPLICATION(S): 213 SOLUTION TOPICAL at 06:07

## 2021-12-01 NOTE — DIETITIAN INITIAL EVALUATION ADULT. - PERTINENT LABORATORY DATA
11-30 Na134 mmol/L<L> Glu 103 mg/dL<H> K+ 4.6 mmol/L Cr  0.73 mg/dL BUN 19 mg/dL<H>   12-01 Phos 3.0 mg/dL   11-30 Alb 2.1 g/dL<L>

## 2021-12-01 NOTE — DIETITIAN INITIAL EVALUATION ADULT. - PERTINENT MEDS FT
MEDICATIONS  (STANDING):  chlorhexidine 2% Cloths 1 Application(s) Topical <User Schedule>  dexAMETHasone  Injectable 6 milliGRAM(s) IV Push daily  enoxaparin Injectable 40 milliGRAM(s) SubCutaneous every 12 hours  influenza   Vaccine 0.5 milliLiter(s) IntraMuscular once  pantoprazole    Tablet 40 milliGRAM(s) Oral before breakfast  remdesivir  IVPB   IV Intermittent   remdesivir  IVPB 100 milliGRAM(s) IV Intermittent every 24 hours

## 2021-12-01 NOTE — DIETITIAN INITIAL EVALUATION ADULT. - OTHER INFO
Pt lives home with family with diarrhea, nausea, vomiting, poor oral intake PTA; alert, oriented, COVID19+bert, in airborne/contact isolation room, unable to conduct a face to face interview due to limited contact restrictions related to pt's medical condition and isolation precautions., on NRB, Hi-Flow at present, attempt made to contact family ( wife Kajal Mott) at [phone], not available at present; spoke to RN, pt tolerating meal well, no GI distress reported at present

## 2021-12-01 NOTE — PROGRESS NOTE ADULT - SUBJECTIVE AND OBJECTIVE BOX
INTERVAL HPI/OVERNIGHT EVENTS: ***    PRESSORS: [ ] YES [ ] NO  WHICH:    Antimicrobial:  remdesivir  IVPB   IV Intermittent   remdesivir  IVPB 100 milliGRAM(s) IV Intermittent every 24 hours    Cardiovascular:    Pulmonary:  ALBUTerol    90 MICROgram(s) HFA Inhaler 2 Puff(s) Inhalation every 6 hours PRN  guaiFENesin Oral Liquid (Sugar-Free) 200 milliGRAM(s) Oral every 6 hours PRN    Hematalogic:  enoxaparin Injectable 40 milliGRAM(s) SubCutaneous every 12 hours    Other:  acetaminophen     Tablet .. 650 milliGRAM(s) Oral every 6 hours PRN  chlorhexidine 2% Cloths 1 Application(s) Topical <User Schedule>  dexAMETHasone  Injectable 6 milliGRAM(s) IV Push daily  influenza   Vaccine 0.5 milliLiter(s) IntraMuscular once  ondansetron Injectable 4 milliGRAM(s) IV Push every 8 hours PRN  pantoprazole    Tablet 40 milliGRAM(s) Oral before breakfast    acetaminophen     Tablet .. 650 milliGRAM(s) Oral every 6 hours PRN  ALBUTerol    90 MICROgram(s) HFA Inhaler 2 Puff(s) Inhalation every 6 hours PRN  chlorhexidine 2% Cloths 1 Application(s) Topical <User Schedule>  dexAMETHasone  Injectable 6 milliGRAM(s) IV Push daily  enoxaparin Injectable 40 milliGRAM(s) SubCutaneous every 12 hours  guaiFENesin Oral Liquid (Sugar-Free) 200 milliGRAM(s) Oral every 6 hours PRN  influenza   Vaccine 0.5 milliLiter(s) IntraMuscular once  ondansetron Injectable 4 milliGRAM(s) IV Push every 8 hours PRN  pantoprazole    Tablet 40 milliGRAM(s) Oral before breakfast  remdesivir  IVPB   IV Intermittent   remdesivir  IVPB 100 milliGRAM(s) IV Intermittent every 24 hours    Drug Dosing Weight  Height (cm): 188 (28 Nov 2021 19:00)  Weight (kg): 120.1 (28 Nov 2021 19:00)  BMI (kg/m2): 34 (28 Nov 2021 19:00)  BSA (m2): 2.45 (28 Nov 2021 19:00)    CENTRAL LINE: [ ] YES [ ] NO  LOCATION:   DATE INSERTED:  REMOVE: [ ] YES [ ] NO  EXPLAIN:    CRUZ: [ ] YES [ ] NO    DATE INSERTED:  REMOVE:  [ ] YES [ ] NO  EXPLAIN:    A-LINE:  [ ] YES [ ] NO  LOCATION:   DATE INSERTED:  REMOVE:  [ ] YES [ ] NO  EXPLAIN:    PMH -reviewed admission note, no change since admission  PAST MEDICAL & SURGICAL HISTORY:  No pertinent past medical history    No significant past surgical history        ICU Vital Signs Last 24 Hrs  T(C): 35.7 (01 Dec 2021 16:00), Max: 36.9 (30 Nov 2021 19:05)  T(F): 96.2 (01 Dec 2021 16:00), Max: 98.5 (30 Nov 2021 19:05)  HR: 78 (01 Dec 2021 16:00) (73 - 108)  BP: 104/65 (01 Dec 2021 16:00) (81/41 - 122/71)  BP(mean): 74 (01 Dec 2021 16:00) (50 - 84)  ABP: --  ABP(mean): --  RR: 27 (01 Dec 2021 16:00) (20 - 34)  SpO2: 93% (01 Dec 2021 16:00) (90% - 97%)            11-30 @ 07:01  -  12-01 @ 07:00  --------------------------------------------------------  IN: 440 mL / OUT: 1075 mL / NET: -635 mL            PHYSICAL EXAM:    GENERAL: NAD, well-groomed, well-developed  HEAD:  Atraumatic, Normocephalic  EYES: EOMI, PERRLA, conjunctiva and sclera clear  ENMT: No tonsillar erythema, exudates, or enlargement; Moist mucous membranes, Good dentition, No lesions  NECK: Supple, normal appearance, No JVD; Normal thyroid; Trachea midline  NERVOUS SYSTEM:  Alert & Oriented X3,  Motor Strength 5/5 B/L upper and lower extremities; DTRs 2+ intact and symmetric  CHEST/LUNG: No chest deformity; Normal percussion bilaterally; No rales, rhonchi, wheezing   HEART: Regular rate and rhythm; No murmurs, rubs, or gallops  ABDOMEN: Soft, Nontender, Nondistended; Bowel sounds present  EXTREMITIES:  2+ Peripheral Pulses, No clubbing, cyanosis, or edema  LYMPH: No lymphadenopathy noted  SKIN: No rashes or lesions;  Good capillary refill      LABS:  CBC Full  -  ( 01 Dec 2021 05:56 )  WBC Count : 17.55 K/uL  RBC Count : 5.37 M/uL  Hemoglobin : 15.4 g/dL  Hematocrit : 45.3 %  Platelet Count - Automated : 493 K/uL  Mean Cell Volume : 84.4 fl  Mean Cell Hemoglobin : 28.7 pg  Mean Cell Hemoglobin Concentration : 34.0 gm/dL  Auto Neutrophil # : 11.61 K/uL  Auto Lymphocyte # : 2.68 K/uL  Auto Monocyte # : 1.56 K/uL  Auto Eosinophil # : 0.40 K/uL  Auto Basophil # : 0.13 K/uL  Auto Neutrophil % : 66.1 %  Auto Lymphocyte % : 15.3 %  Auto Monocyte % : 8.9 %  Auto Eosinophil % : 2.3 %  Auto Basophil % : 0.7 %    11-30    134<L>  |  102  |  19<H>  ----------------------------<  103<H>  4.6   |  25  |  0.73    Ca    8.5      30 Nov 2021 04:40  Phos  3.0     12-01  Mg     2.5     12-01    TPro  7.0  /  Alb  2.1<L>  /  TBili  0.5  /  DBili  x   /  AST  15  /  ALT  68<H>  /  AlkPhos  52  11-30            RADIOLOGY & ADDITIONAL STUDIES REVIEWED:  ***    [ ]GOALS OF CARE DISCUSSION WITH PATIENT/FAMILY/PROXY:    CRITICAL CARE TIME SPENT: 35 minutes INTERVAL HPI/OVERNIGHT EVENTS: Pt remains on 5 L NC, no other complaints    PRESSORS: [ ] YES [x ] NO  WHICH:    Antimicrobial:  remdesivir  IVPB   IV Intermittent   remdesivir  IVPB 100 milliGRAM(s) IV Intermittent every 24 hours    Cardiovascular:    Pulmonary:  ALBUTerol    90 MICROgram(s) HFA Inhaler 2 Puff(s) Inhalation every 6 hours PRN  guaiFENesin Oral Liquid (Sugar-Free) 200 milliGRAM(s) Oral every 6 hours PRN    Hematalogic:  enoxaparin Injectable 40 milliGRAM(s) SubCutaneous every 12 hours    Other:  acetaminophen     Tablet .. 650 milliGRAM(s) Oral every 6 hours PRN  chlorhexidine 2% Cloths 1 Application(s) Topical <User Schedule>  dexAMETHasone  Injectable 6 milliGRAM(s) IV Push daily  influenza   Vaccine 0.5 milliLiter(s) IntraMuscular once  ondansetron Injectable 4 milliGRAM(s) IV Push every 8 hours PRN  pantoprazole    Tablet 40 milliGRAM(s) Oral before breakfast    acetaminophen     Tablet .. 650 milliGRAM(s) Oral every 6 hours PRN  ALBUTerol    90 MICROgram(s) HFA Inhaler 2 Puff(s) Inhalation every 6 hours PRN  chlorhexidine 2% Cloths 1 Application(s) Topical <User Schedule>  dexAMETHasone  Injectable 6 milliGRAM(s) IV Push daily  enoxaparin Injectable 40 milliGRAM(s) SubCutaneous every 12 hours  guaiFENesin Oral Liquid (Sugar-Free) 200 milliGRAM(s) Oral every 6 hours PRN  influenza   Vaccine 0.5 milliLiter(s) IntraMuscular once  ondansetron Injectable 4 milliGRAM(s) IV Push every 8 hours PRN  pantoprazole    Tablet 40 milliGRAM(s) Oral before breakfast  remdesivir  IVPB   IV Intermittent   remdesivir  IVPB 100 milliGRAM(s) IV Intermittent every 24 hours    Drug Dosing Weight  Height (cm): 188 (28 Nov 2021 19:00)  Weight (kg): 120.1 (28 Nov 2021 19:00)  BMI (kg/m2): 34 (28 Nov 2021 19:00)  BSA (m2): 2.45 (28 Nov 2021 19:00)    CENTRAL LINE: [ ] YES [x ] NO  LOCATION:   DATE INSERTED:  REMOVE: [ ] YES [ ] NO  EXPLAIN:    CRUZ: [ ] YES [x] NO    DATE INSERTED:  REMOVE:  [ ] YES [ ] NO  EXPLAIN:    A-LINE:  [ ] YES [x ] NO  LOCATION:   DATE INSERTED:  REMOVE:  [ ] YES [ ] NO  EXPLAIN:    PMH -reviewed admission note, no change since admission  PAST MEDICAL & SURGICAL HISTORY:  No pertinent past medical history    No significant past surgical history        ICU Vital Signs Last 24 Hrs  T(C): 35.7 (01 Dec 2021 16:00), Max: 36.9 (30 Nov 2021 19:05)  T(F): 96.2 (01 Dec 2021 16:00), Max: 98.5 (30 Nov 2021 19:05)  HR: 78 (01 Dec 2021 16:00) (73 - 108)  BP: 104/65 (01 Dec 2021 16:00) (81/41 - 122/71)  BP(mean): 74 (01 Dec 2021 16:00) (50 - 84)  ABP: --  ABP(mean): --  RR: 27 (01 Dec 2021 16:00) (20 - 34)  SpO2: 93% (01 Dec 2021 16:00) (90% - 97%)            11-30 @ 07:01  -  12-01 @ 07:00  --------------------------------------------------------  IN: 440 mL / OUT: 1075 mL / NET: -635 mL            PHYSICAL EXAM:    GENERAL: young male sitting upright, not in distress  HEAD:  Atraumatic, Normocephalic  EYES: EOMI, PERRLA, conjunctiva and sclera clear  ENMT: No tonsillar erythema, exudates, or enlargement; Moist mucous membranes, Good dentition, No lesions  NECK: Supple, normal appearance, No JVD; Normal thyroid; Trachea midline  NERVOUS SYSTEM:  Alert & Oriented X3,  Motor Strength 5/5 B/L upper and lower extremities; DTRs 2+ intact and symmetric  CHEST/LUNG: No chest deformity; Normal percussion bilaterally; No rales, rhonchi, wheezing   HEART: Regular rate and rhythm; No murmurs, rubs, or gallops  ABDOMEN: Soft, Nontender, Nondistended; Bowel sounds present  EXTREMITIES:  2+ Peripheral Pulses, No clubbing, cyanosis, or edema  LYMPH: No lymphadenopathy noted  SKIN: No rashes or lesions;  Good capillary refill      LABS:  CBC Full  -  ( 01 Dec 2021 05:56 )  WBC Count : 17.55 K/uL  RBC Count : 5.37 M/uL  Hemoglobin : 15.4 g/dL  Hematocrit : 45.3 %  Platelet Count - Automated : 493 K/uL  Mean Cell Volume : 84.4 fl  Mean Cell Hemoglobin : 28.7 pg  Mean Cell Hemoglobin Concentration : 34.0 gm/dL  Auto Neutrophil # : 11.61 K/uL  Auto Lymphocyte # : 2.68 K/uL  Auto Monocyte # : 1.56 K/uL  Auto Eosinophil # : 0.40 K/uL  Auto Basophil # : 0.13 K/uL  Auto Neutrophil % : 66.1 %  Auto Lymphocyte % : 15.3 %  Auto Monocyte % : 8.9 %  Auto Eosinophil % : 2.3 %  Auto Basophil % : 0.7 %    11-30    134<L>  |  102  |  19<H>  ----------------------------<  103<H>  4.6   |  25  |  0.73    Ca    8.5      30 Nov 2021 04:40  Phos  3.0     12-01  Mg     2.5     12-01    TPro  7.0  /  Alb  2.1<L>  /  TBili  0.5  /  DBili  x   /  AST  15  /  ALT  68<H>  /  AlkPhos  52  11-30            RADIOLOGY & ADDITIONAL STUDIES REVIEWED:  ***    [ ]GOALS OF CARE DISCUSSION WITH PATIENT/FAMILY/PROXY:    CRITICAL CARE TIME SPENT: 35 minutes

## 2021-12-01 NOTE — DIETITIAN INITIAL EVALUATION ADULT. - PROBLEM SELECTOR PLAN 1
- On admission, patient with cough, sob, fever, leucocytosis, tachycardia  -- SIRS criteria   - CXR showed worsening bilateral patchy opacities, EKG NSR  - COVID19 PCR +ve on 11/23  - F/u COVID19 AB, LDH, Procalcitonin, ferritin, D-Dimer, CRP, ESR    - Tylenol, Albuterol Inhaler, Robitussin PRN  - Monitor respiratory status   - Started on Decadron 6 mg IV daily for 10 days and Remdesivir   - Oxygen supplementation to keep O2 sat >92%  - Isolation precautions plan per MD

## 2021-12-01 NOTE — DIETITIAN INITIAL EVALUATION ADULT. - PROBLEM SELECTOR PLAN 3
IMPROVE VTE Individual Risk Assessment  RISK                                                                Points  [  ] Previous VTE                                                  3  [  ] Thrombophilia                                               2  [  ] Lower limb paralysis                                      2        (unable to hold up >15 seconds)    [  ] Current Cancer                                              2         (within 6 months)  [  ] Immobilization > 24 hrs                                1  [  ] ICU/CCU stay > 24 hours                              1  [  ] Age > 60                                                      1  IMPROVE VTE Score _________    PPI for GI prophylaxis  Lovenox for DVT PPX plan per MD

## 2021-12-01 NOTE — PROGRESS NOTE ADULT - ASSESSMENT
35 year old man with no significant past medical hx came to ED c/o worsening cough, sob, fever, chills, headache, nausea, vomiting, diarrhea, poor oral intake. Patient is unvaccinated for COVID19, tested positive on 11/23. Initially patient's oxygen requirement was low but has been increasing since admission. He was on 6L NC but desaturating into 80s and tachypneic Patient now on NRB 10L saturating 94-95% and is tachypneic in the 30s. Pt will benefit from HFNC, hence patient will be transferred to ICU for further management.      #Acute Hypoxic Respiratory Failure  #COVID-19 Pneumonia    PLAN:  #NEURO  Pt is AAOx3  No active issues    #PULMONARY  *Acute hypoxemic respiratory failure due to COVID-19 Pneumonia.   P/w cough, sob, fever, leucocytosis, tachycardia   - CXR showed worsening bilateral patchy opacities  - Worsening hypoxia and tachypneic requiring HFNC  - COVID19 PCR +ve on 11/23  - D dimer mild elevated  271>>246  - C/w Tylenol, Albuterol Inhaler  - c/w with Zofran and Robitussin PRN  - C/w Decadron 6 mg IV daily for 10 days  - started on Remdesivir  - Isolation precautions  - Blood Cx NGTD   - CT angio negative for PE, c/w prophylactic lovenox for AC  - f/u daily biomarkers.    #CARDIOVASCULAR  - HR and BPs controlled  - EKG showed NSR  - D-dimer 271>>246>> 295 11/30  - CTA chest negative for PE    #GASTROINTESTINAL  - On protonix for stress ulcer prophylaxis    #RENAL  - BU/Creat 16/0.94  - No active issues    #INFECTIOUS DISEASE  *COVID PNA  - management same as above    #ENDOCRINE  - Blood sugars WNL  - Monitor BG levels considering pt is on decadron    #HEME  *Leucocytosis  - Whit count 18.38>>14.32>> 16 k 11/30  - Due to COVID-19 PNA infection    #PROPHYLAXIS  - DVT ppx with lovenox sq  - GI ppx with Protonix    DISPO:  Transfer to ICU  CODE STATUS:  FULL CODE       35 year old man with no significant past medical hx came to ED c/o worsening cough, sob, fever, chills, headache, nausea, vomiting, diarrhea, poor oral intake. Patient is unvaccinated for COVID19, tested positive on 11/23. Initially patient's oxygen requirement was low but has been increasing since admission. He was on 6L NC but desaturating into 80s and tachypneic Patient now on NRB 10L saturating 94-95% and is tachypneic in the 30s. Pt will benefit from HFNC, hence patient will be transferred to ICU for further management.      #Acute Hypoxic Respiratory Failure  #COVID-19 Pneumonia    PLAN:  #NEURO  Pt is AAOx3  No active issues    #PULMONARY  *Acute hypoxemic respiratory failure due to COVID-19 Pneumonia.   P/w cough, sob, fever, leucocytosis, tachycardia   - CXR showed worsening bilateral patchy opacities  - Worsening hypoxia and tachypneic requiring HFNC  - COVID19 PCR +ve on 11/23  - D dimer mild elevated  271>>246  - C/w Tylenol, Albuterol Inhaler  - c/w with Zofran and Robitussin PRN  - C/w Decadron 6 mg IV daily for 10 days, today is day 6, 12/1  - started on Remdesivir, Day 4, 12/1  - Isolation precautions  - Blood Cx NGTD   - CT angio negative for PE, c/w prophylactic lovenox for AC  - f/u daily biomarkers.    #CARDIOVASCULAR  - HR and BPs controlled  - EKG showed NSR  - D-dimer 271>>246>> 295 11/30  - CTA chest negative for PE    #GASTROINTESTINAL  - On protonix for stress ulcer prophylaxis    #RENAL  - BU/Creat 16/0.94  - No active issues    #INFECTIOUS DISEASE  *COVID PNA  - management same as above    #ENDOCRINE  - Blood sugars WNL  - Monitor BG levels considering pt is on decadron    #HEME  *Leucocytosis  - Whit count 18.38>>14.32>> 16 k> 17k 12/1  - Due to COVID-19 PNA infection    #PROPHYLAXIS  - DVT ppx with lovenox sq  - GI ppx with Protonix    DISPO:  Transfer to ICU  CODE STATUS:  FULL CODE

## 2021-12-02 LAB
ALBUMIN SERPL ELPH-MCNC: 2.2 G/DL — LOW (ref 3.5–5)
ALP SERPL-CCNC: 48 U/L — SIGNIFICANT CHANGE UP (ref 40–120)
ALT FLD-CCNC: 62 U/L DA — HIGH (ref 10–60)
ANION GAP SERPL CALC-SCNC: 7 MMOL/L — SIGNIFICANT CHANGE UP (ref 5–17)
AST SERPL-CCNC: 11 U/L — SIGNIFICANT CHANGE UP (ref 10–40)
BASOPHILS # BLD AUTO: 0.12 K/UL — SIGNIFICANT CHANGE UP (ref 0–0.2)
BASOPHILS NFR BLD AUTO: 0.7 % — SIGNIFICANT CHANGE UP (ref 0–2)
BILIRUB SERPL-MCNC: 0.4 MG/DL — SIGNIFICANT CHANGE UP (ref 0.2–1.2)
BUN SERPL-MCNC: 25 MG/DL — HIGH (ref 7–18)
CALCIUM SERPL-MCNC: 8.8 MG/DL — SIGNIFICANT CHANGE UP (ref 8.4–10.5)
CHLORIDE SERPL-SCNC: 103 MMOL/L — SIGNIFICANT CHANGE UP (ref 96–108)
CO2 SERPL-SCNC: 25 MMOL/L — SIGNIFICANT CHANGE UP (ref 22–31)
CREAT SERPL-MCNC: 0.8 MG/DL — SIGNIFICANT CHANGE UP (ref 0.5–1.3)
CRP SERPL-MCNC: 18 MG/L — HIGH
D DIMER BLD IA.RAPID-MCNC: 233 NG/ML DDU — HIGH
EOSINOPHIL # BLD AUTO: 0.21 K/UL — SIGNIFICANT CHANGE UP (ref 0–0.5)
EOSINOPHIL NFR BLD AUTO: 1.2 % — SIGNIFICANT CHANGE UP (ref 0–6)
ERYTHROCYTE [SEDIMENTATION RATE] IN BLOOD: 18 MM/HR — HIGH (ref 0–15)
GLUCOSE BLDC GLUCOMTR-MCNC: 113 MG/DL — HIGH (ref 70–99)
GLUCOSE BLDC GLUCOMTR-MCNC: 130 MG/DL — HIGH (ref 70–99)
GLUCOSE BLDC GLUCOMTR-MCNC: 171 MG/DL — HIGH (ref 70–99)
GLUCOSE SERPL-MCNC: 105 MG/DL — HIGH (ref 70–99)
HCT VFR BLD CALC: 43.5 % — SIGNIFICANT CHANGE UP (ref 39–50)
HGB BLD-MCNC: 14.8 G/DL — SIGNIFICANT CHANGE UP (ref 13–17)
IMM GRANULOCYTES NFR BLD AUTO: 7.3 % — HIGH (ref 0–1.5)
LDH SERPL L TO P-CCNC: 293 U/L — HIGH (ref 120–225)
LYMPHOCYTES # BLD AUTO: 14 % — SIGNIFICANT CHANGE UP (ref 13–44)
LYMPHOCYTES # BLD AUTO: 2.51 K/UL — SIGNIFICANT CHANGE UP (ref 1–3.3)
MAGNESIUM SERPL-MCNC: 2.4 MG/DL — SIGNIFICANT CHANGE UP (ref 1.6–2.6)
MCHC RBC-ENTMCNC: 28.5 PG — SIGNIFICANT CHANGE UP (ref 27–34)
MCHC RBC-ENTMCNC: 34 GM/DL — SIGNIFICANT CHANGE UP (ref 32–36)
MCV RBC AUTO: 83.8 FL — SIGNIFICANT CHANGE UP (ref 80–100)
MONOCYTES # BLD AUTO: 1.58 K/UL — HIGH (ref 0–0.9)
MONOCYTES NFR BLD AUTO: 8.8 % — SIGNIFICANT CHANGE UP (ref 2–14)
NEUTROPHILS # BLD AUTO: 12.22 K/UL — HIGH (ref 1.8–7.4)
NEUTROPHILS NFR BLD AUTO: 68 % — SIGNIFICANT CHANGE UP (ref 43–77)
NRBC # BLD: 0 /100 WBCS — SIGNIFICANT CHANGE UP (ref 0–0)
PHOSPHATE SERPL-MCNC: 3.6 MG/DL — SIGNIFICANT CHANGE UP (ref 2.5–4.5)
PLATELET # BLD AUTO: 524 K/UL — HIGH (ref 150–400)
POTASSIUM SERPL-MCNC: 4.4 MMOL/L — SIGNIFICANT CHANGE UP (ref 3.5–5.3)
POTASSIUM SERPL-SCNC: 4.4 MMOL/L — SIGNIFICANT CHANGE UP (ref 3.5–5.3)
PROCALCITONIN SERPL-MCNC: 0.04 NG/ML — SIGNIFICANT CHANGE UP (ref 0.02–0.1)
PROT SERPL-MCNC: 6.9 G/DL — SIGNIFICANT CHANGE UP (ref 6–8.3)
RBC # BLD: 5.19 M/UL — SIGNIFICANT CHANGE UP (ref 4.2–5.8)
RBC # FLD: 12.4 % — SIGNIFICANT CHANGE UP (ref 10.3–14.5)
SODIUM SERPL-SCNC: 135 MMOL/L — SIGNIFICANT CHANGE UP (ref 135–145)
WBC # BLD: 17.95 K/UL — HIGH (ref 3.8–10.5)
WBC # FLD AUTO: 17.95 K/UL — HIGH (ref 3.8–10.5)

## 2021-12-02 RX ORDER — INSULIN LISPRO 100/ML
VIAL (ML) SUBCUTANEOUS
Refills: 0 | Status: DISCONTINUED | OUTPATIENT
Start: 2021-12-02 | End: 2021-12-03

## 2021-12-02 RX ADMIN — REMDESIVIR 500 MILLIGRAM(S): 5 INJECTION INTRAVENOUS at 13:47

## 2021-12-02 RX ADMIN — PANTOPRAZOLE SODIUM 40 MILLIGRAM(S): 20 TABLET, DELAYED RELEASE ORAL at 06:11

## 2021-12-02 RX ADMIN — ENOXAPARIN SODIUM 40 MILLIGRAM(S): 100 INJECTION SUBCUTANEOUS at 05:19

## 2021-12-02 RX ADMIN — ENOXAPARIN SODIUM 40 MILLIGRAM(S): 100 INJECTION SUBCUTANEOUS at 17:20

## 2021-12-02 RX ADMIN — Medication 1: at 11:50

## 2021-12-02 RX ADMIN — CHLORHEXIDINE GLUCONATE 1 APPLICATION(S): 213 SOLUTION TOPICAL at 05:19

## 2021-12-02 RX ADMIN — Medication 6 MILLIGRAM(S): at 05:19

## 2021-12-02 NOTE — PROGRESS NOTE ADULT - ATTENDING COMMENTS
IMP: This is a 35 year old man,  of [place] , non smoker  with no significant past medical hx came to ED c/o worsening cough, sob, fever, chills, headache, nausea, vomiting, diarrhea, poor oral intake. Patient is unvaccinated for COVID19, tested positive on 11/23. Initially patient's oxygen requirement was low but has been increasing since admission. He was on 6L NC but desaturating into 80s and tachypneic Patient now on NRB 10L saturating 94-95% and is tachypneic in the 30s. Pt will benefit from HFNC, hence patient will be transferred to ICU for further management.    -  ASSESSMENT    - Acute Hypoxic Resp Failure  - PNA b/l   - Covid-19 infection   - Elevated Blood Sugar   - Obesity       Plan       - O2 by HFNC or BiPaP as needed to maintain sat>90  - Isolation ; contact and airborne   - Encourage non supine position as tolerated   - Remdesivir x 10 days   - Decadron 6 mg /day x 10 days   - Monitor Biomarkers daily   - Hemodynamic monitoring   - Diet as tolerated   - Monitor I/O  - Monitor blood sugar with coverage   - A1c  - DVT / GI prophy
IMP: This is a 35 year old man,  of [place] , non smoker  with no significant past medical hx came to ED c/o worsening cough, sob, fever, chills, headache, nausea, vomiting, diarrhea, poor oral intake. Patient is unvaccinated for COVID19, tested positive on 11/23. Initially patient's oxygen requirement was low but has been increasing since admission. He was on 6L NC but desaturating into 80s and tachypneic Patient now on NRB 10L saturating 94-95% and is tachypneic in the 30s. Pt will benefit from HFNC, hence patient will be transferred to ICU for further management.    -  ASSESSMENT  - Acute Hypoxic Resp Failure  - PNA b/l   - Covid-19 infection   - Elevated Blood Sugar   - Obesity       Plan   - Comfortable on room air oxygen  - Isolation ; contact and airborne   - Remdesivir x 10 days   - Decadron 6 mg /day x 10 days   - Monitor Biomarkers daily   - Hemodynamic monitoring   - Diet as tolerated   - Monitor I/O  - Monitor blood sugar with coverage   - DVT / GI prophy  - Transfer to medical service today
IMP: This is a 35 year old man,  of [place] , non smoker  with no significant past medical hx came to ED c/o worsening cough, sob, fever, chills, headache, nausea, vomiting, diarrhea, poor oral intake. Patient is unvaccinated for COVID19, tested positive on 11/23. Initially patient's oxygen requirement was low but has been increasing since admission. He was on 6L NC but desaturating into 80s and tachypneic Patient now on NRB 10L saturating 94-95% and is tachypneic in the 30s. Pt will benefit from HFNC, hence patient will be transferred to ICU for further management.    -  ASSESSMENT  - Acute Hypoxic Resp Failure  - PNA b/l   - Covid-19 infection   - Elevated Blood Sugar   - Obesity       Plan   - Transitioned to NC support, monitor respiratory status   - Isolation ; contact and airborne   - Encourage non supine position as tolerated   - Remdesivir x 10 days   - Decadron 6 mg /day x 10 days   - Monitor Biomarkers daily   - Hemodynamic monitoring   - Diet as tolerated   - Monitor I/O  - Monitor blood sugar with coverage   - A1c  - DVT / GI prophy
IMP: This is a 35 year old man,  of [place] , non smoker  with no significant past medical hx came to ED c/o worsening cough, sob, fever, chills, headache, nausea, vomiting, diarrhea, poor oral intake. Patient is unvaccinated for COVID19, tested positive on 11/23. Initially patient's oxygen requirement was low but has been increasing since admission. He was on 6L NC but desaturating into 80s and tachypneic Patient now on NRB 10L saturating 94-95% and is tachypneic in the 30s. Pt will benefit from HFNC, hence patient will be transferred to ICU for further management.    -  ASSESSMENT    - Acute Hypoxic Resp Failure  - PNA b/l   - Covid-19 infection   - Elevated Blood Sugar   - Obesity       Plan       - O2 by HFNC or BiPaP as needed to maintain sat>90  - Isolation ; contact and airborne   - Encourage non supine position as tolerated   - Remdesivir x 10 days   - Decadron 6 mg /day x 10 days   - Monitor Biomarkers daily   - Hemodynamic monitoring   - Diet as tolerated   - Monitor I/O  - Monitor blood sugar with coverage   - A1c  - DVT / GI prophy
36 yo male with no significant past medical hx, morbid obesity presented with worsening sob, unvaccinated for covid 19, admitted for severe COVID pneumonia. Patient with increasing oxygen requirement ( from 5 litre to NRB @ 10 litre) and respiratory distress.   today with increase in oxygen requirement, tachycardia.    # AHRF 2/2 covid 19 pneumonia- worsening  # Severe COVID pneumonia    - on iv dexamethasone 6 mg, positive covid antibodies, will d/sonia Remdesivir. patient unvaccinated  - worsening inflammatory markers as compared to prior admission. d dimer 445> repeat 270  - CTA chest negative for central PE   - follow covid inflammatory markers, crp, LDH, ferritin d dimer.  - c/w contact and airborne isolation.  - monitor and titrate oxygen requirements  - ICU consult for worsening hypoxia, patient accepted to ICU being transferred for further management.
Patient seen and examined at bedside, desaturating to 88% on ambulation, on 3 litre NC.    A/P:  36 yo male with no significant past medical hx, morbid obesity presented with worsening sob, unvaccinated for covid 19, admitted for severe COVID pneumonia.    # AHRF 2/2 covid 19 pneumonia  # Severe COVID pneumonia  # Morbid obesity  # high blood glucose    - will continue on iv dexamethasone 6 mg, and Remdesivir.   - worsening inflammatory markers as compared to prior admission. d dimer 445.  - Follow COVID 19 antibodies. follow covid inflammatory markers, crp, LDH, ferritin d dimer.  - c/w contact and airborne isolation.  - moniter and titrate oxygen requirements  - will check A1c  - DVT ppx s/c Lovenox 40 mg bid as per bmi guidelines.  rest as above

## 2021-12-02 NOTE — PROGRESS NOTE ADULT - PROVIDER SPECIALTY LIST ADULT
Critical Care
Internal Medicine
Internal Medicine
Critical Care
Internal Medicine

## 2021-12-02 NOTE — PROGRESS NOTE ADULT - ASSESSMENT
35 year old man with no significant past medical hx came to ED c/o worsening cough, sob, fever, chills, headache, nausea, vomiting, diarrhea, poor oral intake. Patient is unvaccinated for COVID19, tested positive on 11/23. Initially patient's oxygen requirement was low but has been increasing since admission. He was on 6L NC but desaturating into 80s and tachypneic Patient now on NRB 10L saturating 94-95% and is tachypneic in the 30s. Pt will benefit from HFNC, hence patient will be transferred to ICU for further management.      #Acute Hypoxic Respiratory Failure  #COVID-19 Pneumonia    PLAN:  #NEURO  Pt is AAOx3  No active issues    #PULMONARY  *Acute hypoxemic respiratory failure due to COVID-19 Pneumonia.   P/w cough, sob, fever, leucocytosis, tachycardia   - CXR showed worsening bilateral patchy opacities  - Worsening hypoxia and tachypneic requiring HFNC  - COVID19 PCR +ve on 11/23  - D dimer mild elevated  271>>246  - C/w Tylenol, Albuterol Inhaler  - c/w with Zofran and Robitussin PRN  - C/w Decadron 6 mg IV daily for 10 days, today is day 6, 12/1  - started on Remdesivir, Day 4, 12/1  - Isolation precautions  - Blood Cx NGTD   - CT angio negative for PE, c/w prophylactic lovenox for AC  - f/u daily biomarkers.    #CARDIOVASCULAR  - HR and BPs controlled  - EKG showed NSR  - D-dimer 271>>246>> 295 11/30  - CTA chest negative for PE    #GASTROINTESTINAL  - On protonix for stress ulcer prophylaxis    #RENAL  - BU/Creat 16/0.94  - No active issues    #INFECTIOUS DISEASE  *COVID PNA  - management same as above    #ENDOCRINE  - Blood sugars WNL  - Monitor BG levels considering pt is on decadron    #HEME  *Leucocytosis  - Whit count 18.38>>14.32>> 16 k> 17k 12/1  - Due to COVID-19 PNA infection    #PROPHYLAXIS  - DVT ppx with lovenox sq  - GI ppx with Protonix    DISPO:  Transfer to ICU  CODE STATUS:  FULL CODE         35 year old man with no significant past medical hx came to ED c/o worsening cough, sob, fever, chills, headache, nausea, vomiting, diarrhea, poor oral intake. Patient is unvaccinated for COVID19, tested positive on 11/23. Initially patient's oxygen requirement was low but has been increasing since admission. He was on 6L NC but desaturating into 80s and tachypneic Patient now on NRB 10L saturating 94-95% and is tachypneic in the 30s. Pt will benefit from HFNC, hence patient will be transferred to ICU for further management.      #Acute Hypoxic Respiratory Failure  #COVID-19 Pneumonia    PLAN:  #NEURO  Pt is AAOx3  No active issues    #PULMONARY  *Acute hypoxemic respiratory failure due to COVID-19 Pneumonia.   P/w cough, sob, fever, leucocytosis, tachycardia   - CXR showed worsening bilateral patchy opacities  - Worsening hypoxia and tachypneic requiring HFNC  - COVID19 PCR +ve on 11/23  - D dimer mild elevated  271>>246  - C/w Tylenol, Albuterol Inhaler  - c/w with Zofran and Robitussin PRN  - C/w Decadron 6 mg IV daily for 10 days, today is day 7, 12/2  - started on Remdesivir, Day 5, 12/2  - Isolation precautions  - Blood Cx NGTD   - CT angio negative for PE, c/w prophylactic lovenox for AC  - f/u daily biomarkers.    #CARDIOVASCULAR  - HR and BPs controlled  - EKG showed NSR  - D-dimer 271>>246>> 295 11/30  - CTA chest negative for PE    #GASTROINTESTINAL  - On protonix for stress ulcer prophylaxis    #RENAL  - BU/Creat 16/0.94  - No active issues    #INFECTIOUS DISEASE  *COVID PNA  - management same as above    #ENDOCRINE  - Blood sugars WNL  - Monitor BG levels considering pt is on decadron  - Now on insulin sliding scale    #HEME  *Leucocytosis  - Whit count 18.38>>14.32>> 16 k> 17k> 17k 12/2  - Due to COVID-19 PNA infection    #PROPHYLAXIS  - Lovenox BID for DVT ppx  - GI ppx with Protonix    DISPO:  Transfer to ICU  CODE STATUS:  FULL CODE

## 2021-12-02 NOTE — PROGRESS NOTE ADULT - REASON FOR ADMISSION
AHRF 2/2 COVID19 PNA

## 2021-12-02 NOTE — PROGRESS NOTE ADULT - SUBJECTIVE AND OBJECTIVE BOX
INTERVAL HPI/OVERNIGHT EVENTS: ***    PRESSORS: [ ] YES [ ] NO  WHICH:    Antimicrobial:    Cardiovascular:    Pulmonary:  ALBUTerol    90 MICROgram(s) HFA Inhaler 2 Puff(s) Inhalation every 6 hours PRN  guaiFENesin Oral Liquid (Sugar-Free) 200 milliGRAM(s) Oral every 6 hours PRN    Hematalogic:  enoxaparin Injectable 40 milliGRAM(s) SubCutaneous every 12 hours    Other:  acetaminophen     Tablet .. 650 milliGRAM(s) Oral every 6 hours PRN  chlorhexidine 2% Cloths 1 Application(s) Topical <User Schedule>  dexAMETHasone  Injectable 6 milliGRAM(s) IV Push daily  influenza   Vaccine 0.5 milliLiter(s) IntraMuscular once  insulin lispro (ADMELOG) corrective regimen sliding scale   SubCutaneous three times a day before meals  ondansetron Injectable 4 milliGRAM(s) IV Push every 8 hours PRN  pantoprazole    Tablet 40 milliGRAM(s) Oral before breakfast    acetaminophen     Tablet .. 650 milliGRAM(s) Oral every 6 hours PRN  ALBUTerol    90 MICROgram(s) HFA Inhaler 2 Puff(s) Inhalation every 6 hours PRN  chlorhexidine 2% Cloths 1 Application(s) Topical <User Schedule>  dexAMETHasone  Injectable 6 milliGRAM(s) IV Push daily  enoxaparin Injectable 40 milliGRAM(s) SubCutaneous every 12 hours  guaiFENesin Oral Liquid (Sugar-Free) 200 milliGRAM(s) Oral every 6 hours PRN  influenza   Vaccine 0.5 milliLiter(s) IntraMuscular once  insulin lispro (ADMELOG) corrective regimen sliding scale   SubCutaneous three times a day before meals  ondansetron Injectable 4 milliGRAM(s) IV Push every 8 hours PRN  pantoprazole    Tablet 40 milliGRAM(s) Oral before breakfast    Drug Dosing Weight  Height (cm): 188 (28 Nov 2021 19:00)  Weight (kg): 120.1 (28 Nov 2021 19:00)  BMI (kg/m2): 34 (28 Nov 2021 19:00)  BSA (m2): 2.45 (28 Nov 2021 19:00)    CENTRAL LINE: [ ] YES [ ] NO  LOCATION:   DATE INSERTED:  REMOVE: [ ] YES [ ] NO  EXPLAIN:    CRUZ: [ ] YES [ ] NO    DATE INSERTED:  REMOVE:  [ ] YES [ ] NO  EXPLAIN:    A-LINE:  [ ] YES [ ] NO  LOCATION:   DATE INSERTED:  REMOVE:  [ ] YES [ ] NO  EXPLAIN:    PMH -reviewed admission note, no change since admission  PAST MEDICAL & SURGICAL HISTORY:  No pertinent past medical history    No significant past surgical history        ICU Vital Signs Last 24 Hrs  T(C): 36.5 (02 Dec 2021 12:00), Max: 37.6 (01 Dec 2021 19:35)  T(F): 97.7 (02 Dec 2021 12:00), Max: 99.7 (02 Dec 2021 00:00)  HR: 96 (02 Dec 2021 13:00) (69 - 98)  BP: 97/62 (02 Dec 2021 13:00) (80/68 - 111/69)  BP(mean): 70 (02 Dec 2021 13:00) (47 - 84)  ABP: --  ABP(mean): --  RR: 29 (02 Dec 2021 13:00) (12 - 34)  SpO2: 93% (02 Dec 2021 13:00) (92% - 99%)            12-01 @ 07:01  -  12-02 @ 07:00  --------------------------------------------------------  IN: 480 mL / OUT: 750 mL / NET: -270 mL            PHYSICAL EXAM:    GENERAL: NAD, well-groomed, well-developed  HEAD:  Atraumatic, Normocephalic  EYES: EOMI, PERRLA, conjunctiva and sclera clear  ENMT: No tonsillar erythema, exudates, or enlargement; Moist mucous membranes, Good dentition, No lesions  NECK: Supple, normal appearance, No JVD; Normal thyroid; Trachea midline  NERVOUS SYSTEM:  Alert & Oriented X3,  Motor Strength 5/5 B/L upper and lower extremities; DTRs 2+ intact and symmetric  CHEST/LUNG: No chest deformity; Normal percussion bilaterally; No rales, rhonchi, wheezing   HEART: Regular rate and rhythm; No murmurs, rubs, or gallops  ABDOMEN: Soft, Nontender, Nondistended; Bowel sounds present  EXTREMITIES:  2+ Peripheral Pulses, No clubbing, cyanosis, or edema  LYMPH: No lymphadenopathy noted  SKIN: No rashes or lesions;  Good capillary refill      LABS:  CBC Full  -  ( 02 Dec 2021 04:52 )  WBC Count : 17.95 K/uL  RBC Count : 5.19 M/uL  Hemoglobin : 14.8 g/dL  Hematocrit : 43.5 %  Platelet Count - Automated : 524 K/uL  Mean Cell Volume : 83.8 fl  Mean Cell Hemoglobin : 28.5 pg  Mean Cell Hemoglobin Concentration : 34.0 gm/dL  Auto Neutrophil # : 12.22 K/uL  Auto Lymphocyte # : 2.51 K/uL  Auto Monocyte # : 1.58 K/uL  Auto Eosinophil # : 0.21 K/uL  Auto Basophil # : 0.12 K/uL  Auto Neutrophil % : 68.0 %  Auto Lymphocyte % : 14.0 %  Auto Monocyte % : 8.8 %  Auto Eosinophil % : 1.2 %  Auto Basophil % : 0.7 %    12-02    135  |  103  |  25<H>  ----------------------------<  105<H>  4.4   |  25  |  0.80    Ca    8.8      02 Dec 2021 04:52  Phos  3.6     12-02  Mg     2.4     12-02    TPro  6.9  /  Alb  2.2<L>  /  TBili  0.4  /  DBili  x   /  AST  11  /  ALT  62<H>  /  AlkPhos  48  12-02            RADIOLOGY & ADDITIONAL STUDIES REVIEWED:  ***    [ ]GOALS OF CARE DISCUSSION WITH PATIENT/FAMILY/PROXY:    CRITICAL CARE TIME SPENT: 35 minutes INTERVAL HPI/OVERNIGHT EVENTS: Pt currently titrated off NC to room air    PRESSORS: [ ] YES [x ] NO  WHICH:    Antimicrobial:    Cardiovascular:    Pulmonary:  ALBUTerol    90 MICROgram(s) HFA Inhaler 2 Puff(s) Inhalation every 6 hours PRN  guaiFENesin Oral Liquid (Sugar-Free) 200 milliGRAM(s) Oral every 6 hours PRN    Hematalogic:  enoxaparin Injectable 40 milliGRAM(s) SubCutaneous every 12 hours    Other:  acetaminophen     Tablet .. 650 milliGRAM(s) Oral every 6 hours PRN  chlorhexidine 2% Cloths 1 Application(s) Topical <User Schedule>  dexAMETHasone  Injectable 6 milliGRAM(s) IV Push daily  influenza   Vaccine 0.5 milliLiter(s) IntraMuscular once  insulin lispro (ADMELOG) corrective regimen sliding scale   SubCutaneous three times a day before meals  ondansetron Injectable 4 milliGRAM(s) IV Push every 8 hours PRN  pantoprazole    Tablet 40 milliGRAM(s) Oral before breakfast    acetaminophen     Tablet .. 650 milliGRAM(s) Oral every 6 hours PRN  ALBUTerol    90 MICROgram(s) HFA Inhaler 2 Puff(s) Inhalation every 6 hours PRN  chlorhexidine 2% Cloths 1 Application(s) Topical <User Schedule>  dexAMETHasone  Injectable 6 milliGRAM(s) IV Push daily  enoxaparin Injectable 40 milliGRAM(s) SubCutaneous every 12 hours  guaiFENesin Oral Liquid (Sugar-Free) 200 milliGRAM(s) Oral every 6 hours PRN  influenza   Vaccine 0.5 milliLiter(s) IntraMuscular once  insulin lispro (ADMELOG) corrective regimen sliding scale   SubCutaneous three times a day before meals  ondansetron Injectable 4 milliGRAM(s) IV Push every 8 hours PRN  pantoprazole    Tablet 40 milliGRAM(s) Oral before breakfast    Drug Dosing Weight  Height (cm): 188 (28 Nov 2021 19:00)  Weight (kg): 120.1 (28 Nov 2021 19:00)  BMI (kg/m2): 34 (28 Nov 2021 19:00)  BSA (m2): 2.45 (28 Nov 2021 19:00)    CENTRAL LINE: [ ] YES [ x] NO  LOCATION:   DATE INSERTED:  REMOVE: [ ] YES [ ] NO  EXPLAIN:    CRUZ: [ ] YES [x ] NO    DATE INSERTED:  REMOVE:  [ ] YES [ ] NO  EXPLAIN:    A-LINE:  [ ] YES [x ] NO  LOCATION:   DATE INSERTED:  REMOVE:  [ ] YES [ ] NO  EXPLAIN:    PMH -reviewed admission note, no change since admission  PAST MEDICAL & SURGICAL HISTORY:  No pertinent past medical history    No significant past surgical history        ICU Vital Signs Last 24 Hrs  T(C): 36.5 (02 Dec 2021 12:00), Max: 37.6 (01 Dec 2021 19:35)  T(F): 97.7 (02 Dec 2021 12:00), Max: 99.7 (02 Dec 2021 00:00)  HR: 96 (02 Dec 2021 13:00) (69 - 98)  BP: 97/62 (02 Dec 2021 13:00) (80/68 - 111/69)  BP(mean): 70 (02 Dec 2021 13:00) (47 - 84)  ABP: --  ABP(mean): --  RR: 29 (02 Dec 2021 13:00) (12 - 34)  SpO2: 93% (02 Dec 2021 13:00) (92% - 99%)            12-01 @ 07:01  -  12-02 @ 07:00  --------------------------------------------------------  IN: 480 mL / OUT: 750 mL / NET: -270 mL            PHYSICAL EXAM:    GENERAL: young male resting in bed, not in distress  HEAD:  Atraumatic, Normocephalic  EYES: EOMI, PERRLA, conjunctiva and sclera clear  ENMT: No tonsillar erythema, exudates, or enlargement; Moist mucous membranes, Good dentition, No lesions  NECK: Supple, normal appearance, No JVD; Normal thyroid; Trachea midline  NERVOUS SYSTEM:  Alert & Oriented X3,  Motor Strength 5/5 B/L upper and lower extremities; DTRs 2+ intact and symmetric  CHEST/LUNG: decreased breath sounds   HEART: Regular rate and rhythm; No murmurs, rubs, or gallops  ABDOMEN: Soft, Nontender, Nondistended; Bowel sounds present  EXTREMITIES:  2+ Peripheral Pulses, No clubbing, cyanosis, or edema  LYMPH: No lymphadenopathy noted  SKIN: No rashes or lesions;  Good capillary refill      LABS:  CBC Full  -  ( 02 Dec 2021 04:52 )  WBC Count : 17.95 K/uL  RBC Count : 5.19 M/uL  Hemoglobin : 14.8 g/dL  Hematocrit : 43.5 %  Platelet Count - Automated : 524 K/uL  Mean Cell Volume : 83.8 fl  Mean Cell Hemoglobin : 28.5 pg  Mean Cell Hemoglobin Concentration : 34.0 gm/dL  Auto Neutrophil # : 12.22 K/uL  Auto Lymphocyte # : 2.51 K/uL  Auto Monocyte # : 1.58 K/uL  Auto Eosinophil # : 0.21 K/uL  Auto Basophil # : 0.12 K/uL  Auto Neutrophil % : 68.0 %  Auto Lymphocyte % : 14.0 %  Auto Monocyte % : 8.8 %  Auto Eosinophil % : 1.2 %  Auto Basophil % : 0.7 %    12-02    135  |  103  |  25<H>  ----------------------------<  105<H>  4.4   |  25  |  0.80    Ca    8.8      02 Dec 2021 04:52  Phos  3.6     12-02  Mg     2.4     12-02    TPro  6.9  /  Alb  2.2<L>  /  TBili  0.4  /  DBili  x   /  AST  11  /  ALT  62<H>  /  AlkPhos  48  12-02            RADIOLOGY & ADDITIONAL STUDIES REVIEWED:  ***    [ ]GOALS OF CARE DISCUSSION WITH PATIENT/FAMILY/PROXY:    CRITICAL CARE TIME SPENT: 35 minutes

## 2021-12-02 NOTE — CHART NOTE - NSCHARTNOTEFT_GEN_A_CORE
34 y/o male patient with no significant past medical hx came to ED c/o worsening cough, sob, fever, chills, headache, nausea, vomiting, diarrhea, poor oral intake. Patient is unvaccinated for COVID19, tested positive on 11/23. Patient was admitted on 11/23 for COVID19 infection and discharged on 11/24 however decided to come back for further management, now hypoxic to 88-89% on ambulation.    As patient's oxygen requirement increased during admission, and he was desaturating to 80s with 6L NC, pt was started on NRB. He was on 6L NC but desaturating into 80s and c/o SOB. Patient was using NRB 10L saturating 94-95% and was tachypneic in the 30s, hence shifted to ICU for HFNC. Pt was later titrated off HFNC on 11/30 to NRB to NC. Pt remained on 5 L NC 12/1, downtitrated to RA today 12/2 saturating 93-95%. Pt     Patient is stable for downgrade to floor, pt was signed out to  ***** covering resident and Attending  ****** 34 y/o male patient with no significant past medical hx came to ED c/o worsening cough, sob, fever, chills, headache, nausea, vomiting, diarrhea, poor oral intake. Patient is unvaccinated for COVID19, tested positive on 11/23. Patient was admitted on 11/23 for COVID19 infection and discharged on 11/24 however decided to come back for further management, now hypoxic to 88-89% on ambulation.    As patient's oxygen requirement increased during admission, and he was desaturating to 80s with 6L NC, pt was started on NRB. He was on 6L NC but desaturating into 80s and c/o SOB. Patient was using NRB 10L saturating 94-95% and was tachypneic in the 30s, hence shifted to ICU for HFNC. Pt was later titrated off HFNC on 11/30 to NRB to NC. Pt remained on 5 L NC 12/1, downtitrated to RA today 12/2 saturating 93-95%. Pt completed 5 days of Remdesivir, currently on Decadron Day 7, 12/2    Patient is stable for downgrade to floor, pt was signed out to  ***** covering resident and Attending  ****** 34 y/o male patient with no significant past medical hx came to ED c/o worsening cough, sob, fever, chills, headache, nausea, vomiting, diarrhea, poor oral intake. Patient is unvaccinated for COVID19, tested positive on 11/23. Patient was admitted on 11/23 for COVID19 infection and discharged on 11/24 however decided to come back for further management, now hypoxic to 88-89% on ambulation.    As patient's oxygen requirement increased during admission, and he was desaturating to 80s with 6L NC, pt was started on NRB. He was on 6L NC but desaturating into 80s and c/o SOB. Patient was using NRB 10L saturating 94-95% and was tachypneic in the 30s, hence shifted to ICU for HFNC. Pt was later titrated off HFNC on 11/30 to NRB to NC. Pt remained on 5 L NC 12/1, downtitrated to RA today 12/2 saturating 93-95%. Pt completed 5 days of Remdesivir, currently on Decadron Day 7, 12/2. Pt's BP runs in the range of /60s, highest BP reading was 110 SBP on admission, pt tolerating oral fluids and diet well, asymptomatic.    Patient is stable for downgrade to floor, pt was signed out to Dr. Seo covering resident and Attending Dr. Gallegos

## 2021-12-03 VITALS
OXYGEN SATURATION: 98 % | TEMPERATURE: 98 F | HEART RATE: 100 BPM | SYSTOLIC BLOOD PRESSURE: 113 MMHG | DIASTOLIC BLOOD PRESSURE: 81 MMHG | RESPIRATION RATE: 16 BRPM

## 2021-12-03 LAB
ALBUMIN SERPL ELPH-MCNC: 2.3 G/DL — LOW (ref 3.5–5)
ALP SERPL-CCNC: 51 U/L — SIGNIFICANT CHANGE UP (ref 40–120)
ALT FLD-CCNC: 58 U/L DA — SIGNIFICANT CHANGE UP (ref 10–60)
ANION GAP SERPL CALC-SCNC: 4 MMOL/L — LOW (ref 5–17)
AST SERPL-CCNC: 14 U/L — SIGNIFICANT CHANGE UP (ref 10–40)
BASOPHILS # BLD AUTO: 0.14 K/UL — SIGNIFICANT CHANGE UP (ref 0–0.2)
BASOPHILS NFR BLD AUTO: 0.9 % — SIGNIFICANT CHANGE UP (ref 0–2)
BILIRUB SERPL-MCNC: 0.5 MG/DL — SIGNIFICANT CHANGE UP (ref 0.2–1.2)
BUN SERPL-MCNC: 21 MG/DL — HIGH (ref 7–18)
CALCIUM SERPL-MCNC: 8.8 MG/DL — SIGNIFICANT CHANGE UP (ref 8.4–10.5)
CHLORIDE SERPL-SCNC: 103 MMOL/L — SIGNIFICANT CHANGE UP (ref 96–108)
CO2 SERPL-SCNC: 29 MMOL/L — SIGNIFICANT CHANGE UP (ref 22–31)
CREAT SERPL-MCNC: 0.89 MG/DL — SIGNIFICANT CHANGE UP (ref 0.5–1.3)
CRP SERPL-MCNC: 9 MG/L — HIGH
D DIMER BLD IA.RAPID-MCNC: 230 NG/ML DDU — HIGH
EOSINOPHIL # BLD AUTO: 0.11 K/UL — SIGNIFICANT CHANGE UP (ref 0–0.5)
EOSINOPHIL NFR BLD AUTO: 0.7 % — SIGNIFICANT CHANGE UP (ref 0–6)
ERYTHROCYTE [SEDIMENTATION RATE] IN BLOOD: 14 MM/HR — SIGNIFICANT CHANGE UP (ref 0–15)
FERRITIN SERPL-MCNC: 639 NG/ML — HIGH (ref 30–400)
GLUCOSE BLDC GLUCOMTR-MCNC: 103 MG/DL — HIGH (ref 70–99)
GLUCOSE BLDC GLUCOMTR-MCNC: 144 MG/DL — HIGH (ref 70–99)
GLUCOSE SERPL-MCNC: 94 MG/DL — SIGNIFICANT CHANGE UP (ref 70–99)
HCT VFR BLD CALC: 43.6 % — SIGNIFICANT CHANGE UP (ref 39–50)
HGB BLD-MCNC: 15 G/DL — SIGNIFICANT CHANGE UP (ref 13–17)
IMM GRANULOCYTES NFR BLD AUTO: 7.6 % — HIGH (ref 0–1.5)
LDH SERPL L TO P-CCNC: 278 U/L — HIGH (ref 120–225)
LYMPHOCYTES # BLD AUTO: 18.7 % — SIGNIFICANT CHANGE UP (ref 13–44)
LYMPHOCYTES # BLD AUTO: 2.8 K/UL — SIGNIFICANT CHANGE UP (ref 1–3.3)
MAGNESIUM SERPL-MCNC: 2.3 MG/DL — SIGNIFICANT CHANGE UP (ref 1.6–2.6)
MCHC RBC-ENTMCNC: 28.7 PG — SIGNIFICANT CHANGE UP (ref 27–34)
MCHC RBC-ENTMCNC: 34.4 GM/DL — SIGNIFICANT CHANGE UP (ref 32–36)
MCV RBC AUTO: 83.5 FL — SIGNIFICANT CHANGE UP (ref 80–100)
MONOCYTES # BLD AUTO: 1.32 K/UL — HIGH (ref 0–0.9)
MONOCYTES NFR BLD AUTO: 8.8 % — SIGNIFICANT CHANGE UP (ref 2–14)
NEUTROPHILS # BLD AUTO: 9.46 K/UL — HIGH (ref 1.8–7.4)
NEUTROPHILS NFR BLD AUTO: 63.3 % — SIGNIFICANT CHANGE UP (ref 43–77)
NRBC # BLD: 0 /100 WBCS — SIGNIFICANT CHANGE UP (ref 0–0)
PHOSPHATE SERPL-MCNC: 3.5 MG/DL — SIGNIFICANT CHANGE UP (ref 2.5–4.5)
PLATELET # BLD AUTO: 530 K/UL — HIGH (ref 150–400)
POTASSIUM SERPL-MCNC: 4.2 MMOL/L — SIGNIFICANT CHANGE UP (ref 3.5–5.3)
POTASSIUM SERPL-SCNC: 4.2 MMOL/L — SIGNIFICANT CHANGE UP (ref 3.5–5.3)
PROT SERPL-MCNC: 6.7 G/DL — SIGNIFICANT CHANGE UP (ref 6–8.3)
RBC # BLD: 5.22 M/UL — SIGNIFICANT CHANGE UP (ref 4.2–5.8)
RBC # FLD: 12.6 % — SIGNIFICANT CHANGE UP (ref 10.3–14.5)
SODIUM SERPL-SCNC: 136 MMOL/L — SIGNIFICANT CHANGE UP (ref 135–145)
WBC # BLD: 14.97 K/UL — HIGH (ref 3.8–10.5)
WBC # FLD AUTO: 14.97 K/UL — HIGH (ref 3.8–10.5)

## 2021-12-03 PROCEDURE — 84100 ASSAY OF PHOSPHORUS: CPT

## 2021-12-03 PROCEDURE — 99285 EMERGENCY DEPT VISIT HI MDM: CPT

## 2021-12-03 PROCEDURE — 82803 BLOOD GASES ANY COMBINATION: CPT

## 2021-12-03 PROCEDURE — 87640 STAPH A DNA AMP PROBE: CPT

## 2021-12-03 PROCEDURE — 71045 X-RAY EXAM CHEST 1 VIEW: CPT

## 2021-12-03 PROCEDURE — 93005 ELECTROCARDIOGRAM TRACING: CPT

## 2021-12-03 PROCEDURE — 86140 C-REACTIVE PROTEIN: CPT

## 2021-12-03 PROCEDURE — 83605 ASSAY OF LACTIC ACID: CPT

## 2021-12-03 PROCEDURE — 94640 AIRWAY INHALATION TREATMENT: CPT

## 2021-12-03 PROCEDURE — 85027 COMPLETE CBC AUTOMATED: CPT

## 2021-12-03 PROCEDURE — 85730 THROMBOPLASTIN TIME PARTIAL: CPT

## 2021-12-03 PROCEDURE — 82728 ASSAY OF FERRITIN: CPT

## 2021-12-03 PROCEDURE — 83615 LACTATE (LD) (LDH) ENZYME: CPT

## 2021-12-03 PROCEDURE — 86769 SARS-COV-2 COVID-19 ANTIBODY: CPT

## 2021-12-03 PROCEDURE — 80053 COMPREHEN METABOLIC PANEL: CPT

## 2021-12-03 PROCEDURE — 71275 CT ANGIOGRAPHY CHEST: CPT

## 2021-12-03 PROCEDURE — 84145 PROCALCITONIN (PCT): CPT

## 2021-12-03 PROCEDURE — 87641 MR-STAPH DNA AMP PROBE: CPT

## 2021-12-03 PROCEDURE — 85025 COMPLETE CBC W/AUTO DIFF WBC: CPT

## 2021-12-03 PROCEDURE — 99239 HOSP IP/OBS DSCHRG MGMT >30: CPT | Mod: GC

## 2021-12-03 PROCEDURE — 85384 FIBRINOGEN ACTIVITY: CPT

## 2021-12-03 PROCEDURE — 82962 GLUCOSE BLOOD TEST: CPT

## 2021-12-03 PROCEDURE — 96367 TX/PROPH/DG ADDL SEQ IV INF: CPT

## 2021-12-03 PROCEDURE — 80048 BASIC METABOLIC PNL TOTAL CA: CPT

## 2021-12-03 PROCEDURE — 87040 BLOOD CULTURE FOR BACTERIA: CPT

## 2021-12-03 PROCEDURE — 36415 COLL VENOUS BLD VENIPUNCTURE: CPT

## 2021-12-03 PROCEDURE — 96375 TX/PRO/DX INJ NEW DRUG ADDON: CPT

## 2021-12-03 PROCEDURE — 96365 THER/PROPH/DIAG IV INF INIT: CPT

## 2021-12-03 PROCEDURE — 81001 URINALYSIS AUTO W/SCOPE: CPT

## 2021-12-03 PROCEDURE — 85379 FIBRIN DEGRADATION QUANT: CPT

## 2021-12-03 PROCEDURE — 85610 PROTHROMBIN TIME: CPT

## 2021-12-03 PROCEDURE — 85652 RBC SED RATE AUTOMATED: CPT

## 2021-12-03 PROCEDURE — 83735 ASSAY OF MAGNESIUM: CPT

## 2021-12-03 PROCEDURE — 80076 HEPATIC FUNCTION PANEL: CPT

## 2021-12-03 PROCEDURE — 87086 URINE CULTURE/COLONY COUNT: CPT

## 2021-12-03 PROCEDURE — 87635 SARS-COV-2 COVID-19 AMP PRB: CPT

## 2021-12-03 RX ADMIN — CHLORHEXIDINE GLUCONATE 1 APPLICATION(S): 213 SOLUTION TOPICAL at 06:07

## 2021-12-03 RX ADMIN — Medication 6 MILLIGRAM(S): at 06:10

## 2021-12-03 RX ADMIN — ENOXAPARIN SODIUM 40 MILLIGRAM(S): 100 INJECTION SUBCUTANEOUS at 06:06

## 2021-12-03 RX ADMIN — PANTOPRAZOLE SODIUM 40 MILLIGRAM(S): 20 TABLET, DELAYED RELEASE ORAL at 06:07

## 2021-12-03 NOTE — DISCHARGE NOTE PROVIDER - NSDCCPCAREPLAN_GEN_ALL_CORE_FT
PRINCIPAL DISCHARGE DIAGNOSIS  Diagnosis: Acute hypoxemic respiratory failure due to COVID-19  Assessment and Plan of Treatment: You came to the hospital because you were having trouble breathing and your oxygen levels were low due to  covid 19. During your hospitalization you were treated with remdesevir and dexamethasone. You required oxygen supplementation to increase your oxygen levels. Initially, you were on nasal canula but because your oxygen levels were still low we had to send you to the ICU and give you oxygen through high flow nasal canula. Your oxygen levels continued to improve and you were stable enough to come back to the medicine floors. Eventually, you no longer needed any supplemental oxygen because your oxygen levels were normal on Room Air. Your symptoms continued to improve. Please make sure you follow up with your primary care doctor and let them know you were hospitalized due to COVID 19.       PRINCIPAL DISCHARGE DIAGNOSIS  Diagnosis: Acute hypoxemic respiratory failure due to COVID-19  Assessment and Plan of Treatment: You came to the hospital because you were having trouble breathing and your oxygen levels were low due to  covid 19. During your hospitalization you were treated with remdesevir and dexamethasone. You required oxygen supplementation to increase your oxygen levels. Initially, you were on nasal canula but because your oxygen levels were still low we had to send you to the ICU and give you oxygen through high flow nasal canula. Your oxygen levels continued to improve and you were stable enough to come back to the medicine floors. Eventually, you no longer needed any supplemental oxygen because your oxygen levels were normal on Room Air. Your symptoms continued to improve. Please make sure you follow up with your primary care doctor and let them know you were hospitalized due to COVID 19. Please take your lost dose of steroids tomorrow as instructed that was sent to your pharmacy.      SECONDARY DISCHARGE DIAGNOSES  Diagnosis: Sepsis  Assessment and Plan of Treatment: Sepsis secondary to COVID-19 PNA was present on admission. Patient responded well to above management with improvement in all parameters.

## 2021-12-03 NOTE — DISCHARGE NOTE PROVIDER - HOSPITAL COURSE
34 y/o male patient with no significant past medical hx came to ED c/o worsening cough, sob, fever, chills, headache, nausea, vomiting, diarrhea, poor oral intake. Patient is unvaccinated for COVID19, tested positive on 11/23. Patient was admitted on 11/23 for COVID19 infection and discharged on 11/24 however decided to come back for further management, now coming back hypoxic to 88-89% on ambulation. As patient's oxygen requirement increased during admission, and he was desaturating to 80s with 6 liter nasal canula, patient was started on non-rebreather. Patient was using non rebreather 10L saturating 94-95% and was tachypneic in the 30s, hence shifted to ICU for HFNC. Pt was later titrated off HFNC on 11/30 to NRB to NC. Pt remained on 5 L NC 12/1, down titrated to RA today 12/2 saturating 93-95%. Pt completed 5 days of Remdesivir, currently on Decadron Day 8, 12/2. Pt's BP runs in the range of /60s, highest BP reading was 110 SBP on admission, pt tolerating oral fluids and diet well, asymptomatic. Patient was stable for downgrade to medicine floor. Patient was monitored on medicine floor for 24 hours and oxygen levels remain stabled. He was on room air saturating 96% and 94% during ambulation. Patient asymptomatic and no signs of dyspnea. He will be discharged home and follow up with his primary care doctor.       Given patient's improved clinical status and current hemodynamic stability, decision was made to discharge. Discussed with attending  Please refer to patient's complete medical chart with documents for a full hospital course, for this is only a brief summary.        34 y/o male patient with no significant past medical hx came to ED c/o worsening cough, sob, fever, chills, headache, nausea, vomiting, diarrhea, poor oral intake. Patient is unvaccinated for COVID19, tested positive on 11/23. Patient was admitted on 11/23 for COVID19 infection and discharged on 11/24 however decided to come back for further management, now coming back hypoxic to 88-89% on ambulation. As patient's oxygen requirement increased during admission, and he was desaturating to 80s with 6 liter nasal canula, patient was started on non-rebreather. Patient was using non rebreather 10L saturating 94-95% and was tachypneic in the 30s, hence shifted to ICU for HFNC. Pt was later titrated off HFNC on 11/30 to NRB to NC. Pt remained on 5 L NC 12/1, down titrated to RA today 12/2 saturating 93-95%. Pt completed 5 days of Remdesivir, currently on Decadron Day 8, 12/2. Pt's BP runs in the range of /60s, highest BP reading was 110 SBP on admission, pt tolerating oral fluids and diet well, asymptomatic. Patient was stable for downgrade to medicine floor. Patient was monitored on medicine floor for 24 hours and oxygen levels remain stabled. He was on room air saturating 96% and 94% during ambulation. Patient asymptomatic and no signs of dyspnea. He will be discharged home and follow up with his primary care doctor.       Given patient's improved clinical status and current hemodynamic stability, decision was made to discharge. Discussed with attending  Please refer to patient's complete medical chart with documents for a full hospital course, for this is only a brief summary.    Med Attending Addendum  Sepsis secondary to COVID-19 PNA was present on admission

## 2021-12-03 NOTE — DISCHARGE NOTE NURSING/CASE MANAGEMENT/SOCIAL WORK - NSDCPEFALRISK_GEN_ALL_CORE
For information on Fall & Injury Prevention, visit: [email or web address] OR  [email or web address] OR  https://www.cdc.gov/steadi/patient.html

## 2021-12-03 NOTE — DISCHARGE NOTE NURSING/CASE MANAGEMENT/SOCIAL WORK - PATIENT PORTAL LINK FT
You can access the FollowMyHealth Patient Portal offered by [facility] by registering at the following website: [email or web address]. By joining [facility]’s FollowMyHealth portal, you will also be able to view your health information using other applications (apps) compatible with our system.

## 2021-12-03 NOTE — CONSULT NOTE ADULT - SUBJECTIVE AND OBJECTIVE BOX
Time of visit:    CHIEF COMPLAINT: Patient is a 35y old  Male who presents with a chief complaint of AHRF 2/2 COVID19 PNA (03 Dec 2021 10:35)      HPI:  35 year old man with no significant past medical hx came to ED c/o worsening cough, sob, fever, chills, headache, nausea, vomiting, diarrhea, poor oral intake. Patient is unvaccinated for COVID19, tested positive on 11/23. Patient denies palpitations, chest pain, bleeding, hemoptysis, abdominal pain or dizziness.   Of note, patient was admitted on 11/23 for COVID19 infection and discharged on 11/24 however decided to come back for further management, now hypoxic to 88-89% on ambulation.   (25 Nov 2021 21:44)   Patient seen and examined.     PAST MEDICAL & SURGICAL HISTORY:  No pertinent past medical history    No significant past surgical history        Allergies    No Known Allergies    Intolerances        MEDICATIONS  (STANDING):  chlorhexidine 2% Cloths 1 Application(s) Topical <User Schedule>  dexAMETHasone  Injectable 6 milliGRAM(s) IV Push daily  enoxaparin Injectable 40 milliGRAM(s) SubCutaneous every 12 hours  influenza   Vaccine 0.5 milliLiter(s) IntraMuscular once  insulin lispro (ADMELOG) corrective regimen sliding scale   SubCutaneous three times a day before meals  pantoprazole    Tablet 40 milliGRAM(s) Oral before breakfast      MEDICATIONS  (PRN):  acetaminophen     Tablet .. 650 milliGRAM(s) Oral every 6 hours PRN Temp greater or equal to 38C (100.4F), Mild Pain (1 - 3)  ALBUTerol    90 MICROgram(s) HFA Inhaler 2 Puff(s) Inhalation every 6 hours PRN Shortness of Breath and/or Wheezing  guaiFENesin Oral Liquid (Sugar-Free) 200 milliGRAM(s) Oral every 6 hours PRN Cough  ondansetron Injectable 4 milliGRAM(s) IV Push every 8 hours PRN Nausea and/or Vomiting   Medications up to date at time of exam.    Medications up to date at time of exam.    FAMILY HISTORY:      SOCIAL HISTORY  Smoking History: [   ] smoking/smoke exposure, [   ] former smoker  Living Condition: [   ] apartment, [   ] private house  Work History:   Travel History: denies recent travel  Illicit Substance Use: denies  Alcohol Use: denies    REVIEW OF SYSTEMS:    CONSTITUTIONAL:  denies fevers, chills, sweats, weight loss    HEENT:  denies diplopia or blurred vision, sore throat or runny nose.    CARDIOVASCULAR:  denies pressure, squeezing, tightness, or heaviness about the chest; no palpitations.    RESPIRATORY:  denies SOB, cough, CORREIA, wheezing.    GASTROINTESTINAL:  denies abdominal pain, nausea, vomiting or diarrhea.    GENITOURINARY: denies dysuria, frequency or urgency.    NEUROLOGIC:  denies numbness, tingling, seizures or weakness.    PSYCHIATRIC:  denies disorder of thought or mood.    MSK: denies swelling, redness      PHYSICAL EXAMINATION:    GENERAL: The patient is a well-developed, well-nourished, in no apparent distress.     Vital Signs Last 24 Hrs  T(C): 36.6 (03 Dec 2021 13:07), Max: 36.8 (02 Dec 2021 21:51)  T(F): 97.9 (03 Dec 2021 13:07), Max: 98.2 (02 Dec 2021 21:51)  HR: 100 (03 Dec 2021 13:07) (75 - 100)  BP: 113/81 (03 Dec 2021 13:07) (102/65 - 113/81)  BP(mean): --  RR: 16 (03 Dec 2021 13:07) (16 - 17)  SpO2: 98% (03 Dec 2021 13:07) (92% - 98%)   (if applicable)    Chest Tube (if applicable)    HEENT: Head is normocephalic and atraumatic. Extraocular muscles are intact. Mucous membranes are moist.     NECK: Supple, no palpable adenopathy.    LUNGS: Clear to auscultation, no wheezing, rales, or rhonchi.    HEART: Regular rate and rhythm without murmur.    ABDOMEN: Soft, nontender, and nondistended.  No hepatosplenomegaly is noted.    RENAL: No difficulty voiding, no pelvic pain    EXTREMITIES: Without any cyanosis, clubbing, rash, lesions or edema.    NEUROLOGIC: Awake, alert, oriented, grossly intact    SKIN: Warm, dry, good turgor.      LABS:                        15.0   14.97 )-----------( 530      ( 03 Dec 2021 05:43 )             43.6     12-03    136  |  103  |  21<H>  ----------------------------<  94  4.2   |  29  |  0.89    Ca    8.8      03 Dec 2021 05:43  Phos  3.5     12-03  Mg     2.3     12-03    TPro  6.7  /  Alb  2.3<L>  /  TBili  0.5  /  DBili  x   /  AST  14  /  ALT  58  /  AlkPhos  51  12-03              D-Dimer Assay, Quantitative: 230 ng/mL DDU (12-03-21 @ 05:43)        Procalcitonin, Serum: 0.04 ng/mL (12-02-21 @ 12:15)  Procalcitonin, Serum: 0.04 ng/mL (12-01-21 @ 09:40)      MICROBIOLOGY: (if applicable)    RADIOLOGY & ADDITIONAL STUDIES:  EKG:   CXR:< from: Xray Chest 1 View- PORTABLE-Urgent (Xray Chest 1 View- PORTABLE-Urgent .) (11.30.21 @ 12:01) >    EXAM:  XR CHEST PORTABLE URGENT 1V                            PROCEDURE DATE:  11/30/2021          INTERPRETATION:  Portable chest radiograph    CLINICAL INFORMATION: Sepsis    TECHNIQUE:  Portable  AP view of the chest.    COMPARISON: 11/23/2021 chest available for review.    FINDINGS:    Persistent bilateral  multifocal and diffuse ill-defined airspace opacities.  No pneumothorax.    The heart and mediastinum size and configuration are within normal limits.    Visualized osseous structures are intact.    IMPRESSION:   Persistent bilateral  multifocal and diffuse ill-defined airspace opacities..    --- End of Report ---            JEFF VANEGAS MD; Attending Radiologist  This document has been electronically signed. Dec  2 2021  9:05AM    < end of copied text >    ECHO:    IMPRESSION: 35y Male PAST MEDICAL & SURGICAL HISTORY:  No pertinent past medical history    No significant past surgical history     p/w           IMP: This is a 35 yr old man  with no significant past medical hx came to ED c/o worsening cough, sob, fever, chills, headache, nausea, vomiting, diarrhea, poor oral intake. Patient is unvaccinated for COVID19, tested positive on 11/23. Patient was admitted on 11/23 for COVID19 infection and discharged on 11/24 however decided to come back for further management, now hypoxic to 88-89% on ambulation.    As patient's oxygen requirement increased during admission, and he was desaturating to 80s with 6L NC, pt was started on NRB. He was on 6L NC but desaturating into 80s and c/o SOB. Patient was using NRB 10L saturating 94-95% and was tachypneic in the 30s, hence shifted to ICU for HFNC. Pt was later titrated off HFNC on 11/30 to NRB to NC. Pt remained on 5 L NC 12/1, down titrated to RA today 12/2 saturating 93-95%. Pt completed 5 days of Remdesivir, currently on Decadron Day 7, 12/2      Sugg;  - taper decadron slowly   - albuterol inhaler 2 puff q6h prn   - out pat DVT prophylaxis   - Tolerating room air   - [facility]

## 2022-08-01 NOTE — ED ADULT NURSE NOTE - NS TRANSFER PATIENT BELONGINGS
Pt c/o generalized itchy rash (hives), nausea, and throat itching that started after eating a lemon larabar at 2115. Pt took 25mg benadryl at 2245 and 25mg at 0000 with minor relief.  Pt also took pepto bismol at 2145.  Pt denies any tongue, throat, lip, or face swelling.   Clothing

## 2023-01-18 NOTE — ED PROVIDER NOTE - CROS ED NEURO ALL NEG
Rx Refill Note  Requested Prescriptions     Pending Prescriptions Disp Refills   • clonazePAM (KlonoPIN) 0.5 MG tablet 30 tablet 0     Sig: Take 1 tablet by mouth At Night As Needed for Anxiety.      Last office visit with prescribing clinician: 9/17/2021   Last telemedicine visit with prescribing clinician: 1/23/2023   Next office visit with prescribing clinician: 1/23/2023                         Would you like a call back once the refill request has been completed: [] Yes [] No    If the office needs to give you a call back, can they leave a voicemail: [] Yes [] No    Promise Castellano CMA  01/18/23, 12:02 EST   - - -